# Patient Record
Sex: FEMALE | Race: WHITE | NOT HISPANIC OR LATINO | Employment: UNEMPLOYED | URBAN - METROPOLITAN AREA
[De-identification: names, ages, dates, MRNs, and addresses within clinical notes are randomized per-mention and may not be internally consistent; named-entity substitution may affect disease eponyms.]

---

## 2017-04-22 ENCOUNTER — HOSPITAL ENCOUNTER (EMERGENCY)
Facility: HOSPITAL | Age: 27
Discharge: HOME | End: 2017-04-22
Payer: MEDICAID

## 2017-04-22 VITALS
HEART RATE: 81 BPM | TEMPERATURE: 98 F | RESPIRATION RATE: 16 BRPM | OXYGEN SATURATION: 100 % | HEIGHT: 66 IN | SYSTOLIC BLOOD PRESSURE: 127 MMHG | BODY MASS INDEX: 27.32 KG/M2 | WEIGHT: 170 LBS | DIASTOLIC BLOOD PRESSURE: 73 MMHG

## 2017-04-22 DIAGNOSIS — Z20.2 EXPOSURE TO STD: ICD-10-CM

## 2017-04-22 DIAGNOSIS — J00 NASOPHARYNGITIS ACUTE: ICD-10-CM

## 2017-04-22 DIAGNOSIS — B96.89 BACTERIAL VAGINITIS: ICD-10-CM

## 2017-04-22 DIAGNOSIS — N76.0 BACTERIAL VAGINITIS: ICD-10-CM

## 2017-04-22 DIAGNOSIS — N72 CERVICITIS: Primary | ICD-10-CM

## 2017-04-22 LAB
B-HCG UR QL: NEGATIVE
BACTERIA GENITAL QL WET PREP: ABNORMAL
BILIRUB UR QL STRIP: NEGATIVE
C TRACH DNA SPEC QL NAA+PROBE: NOT DETECTED
CLARITY UR: CLEAR
CLUE CELLS VAG QL WET PREP: ABNORMAL
COLOR UR: YELLOW
DEPRECATED S PYO AG THROAT QL EIA: NEGATIVE
FILAMENT FUNGI VAG WET PREP-#/AREA: ABNORMAL
GLUCOSE UR QL STRIP: NEGATIVE
HGB UR QL STRIP: NEGATIVE
KETONES UR QL STRIP: NEGATIVE
LEUKOCYTE ESTERASE UR QL STRIP: NEGATIVE
N GONORRHOEA DNA SPEC QL NAA+PROBE: NOT DETECTED
NITRITE UR QL STRIP: NEGATIVE
PH UR STRIP: 6 [PH] (ref 5–8)
PROT UR QL STRIP: NEGATIVE
SP GR UR STRIP: 1.01 (ref 1–1.03)
SPECIMEN SOURCE: ABNORMAL
T VAGINALIS GENITAL QL WET PREP: ABNORMAL
URN SPEC COLLECT METH UR: NORMAL
UROBILINOGEN UR STRIP-ACNC: NEGATIVE EU/DL
WBC #/AREA VAG WET PREP: ABNORMAL
YEAST GENITAL QL WET PREP: ABNORMAL

## 2017-04-22 PROCEDURE — 81003 URINALYSIS AUTO W/O SCOPE: CPT

## 2017-04-22 PROCEDURE — 25000003 PHARM REV CODE 250: Performed by: PHYSICIAN ASSISTANT

## 2017-04-22 PROCEDURE — 87081 CULTURE SCREEN ONLY: CPT

## 2017-04-22 PROCEDURE — 87880 STREP A ASSAY W/OPTIC: CPT

## 2017-04-22 PROCEDURE — 96372 THER/PROPH/DIAG INJ SC/IM: CPT

## 2017-04-22 PROCEDURE — 87210 SMEAR WET MOUNT SALINE/INK: CPT

## 2017-04-22 PROCEDURE — 87591 N.GONORRHOEAE DNA AMP PROB: CPT

## 2017-04-22 PROCEDURE — 81025 URINE PREGNANCY TEST: CPT

## 2017-04-22 PROCEDURE — 99284 EMERGENCY DEPT VISIT MOD MDM: CPT | Mod: 25

## 2017-04-22 PROCEDURE — 63600175 PHARM REV CODE 636 W HCPCS: Performed by: PHYSICIAN ASSISTANT

## 2017-04-22 RX ORDER — KETOROLAC TROMETHAMINE 30 MG/ML
60 INJECTION, SOLUTION INTRAMUSCULAR; INTRAVENOUS
Status: COMPLETED | OUTPATIENT
Start: 2017-04-22 | End: 2017-04-22

## 2017-04-22 RX ORDER — ONDANSETRON 4 MG/1
4 TABLET, FILM COATED ORAL EVERY 8 HOURS PRN
Qty: 9 TABLET | Refills: 0 | Status: SHIPPED | OUTPATIENT
Start: 2017-04-22

## 2017-04-22 RX ORDER — METRONIDAZOLE 500 MG/1
500 TABLET ORAL EVERY 12 HOURS
Qty: 14 TABLET | Refills: 0 | Status: SHIPPED | OUTPATIENT
Start: 2017-04-22 | End: 2017-04-29

## 2017-04-22 RX ORDER — CEFTRIAXONE 250 MG/1
250 INJECTION, POWDER, FOR SOLUTION INTRAMUSCULAR; INTRAVENOUS
Status: COMPLETED | OUTPATIENT
Start: 2017-04-22 | End: 2017-04-22

## 2017-04-22 RX ORDER — AZITHROMYCIN 250 MG/1
1000 TABLET, FILM COATED ORAL
Status: COMPLETED | OUTPATIENT
Start: 2017-04-22 | End: 2017-04-22

## 2017-04-22 RX ORDER — ONDANSETRON 4 MG/1
4 TABLET, ORALLY DISINTEGRATING ORAL
Status: COMPLETED | OUTPATIENT
Start: 2017-04-22 | End: 2017-04-22

## 2017-04-22 RX ORDER — IBUPROFEN 600 MG/1
600 TABLET ORAL EVERY 8 HOURS PRN
Qty: 15 TABLET | Refills: 0 | Status: SHIPPED | OUTPATIENT
Start: 2017-04-22

## 2017-04-22 RX ADMIN — KETOROLAC TROMETHAMINE 60 MG: 60 INJECTION, SOLUTION INTRAMUSCULAR at 04:04

## 2017-04-22 RX ADMIN — ONDANSETRON 4 MG: 4 TABLET, ORALLY DISINTEGRATING ORAL at 04:04

## 2017-04-22 RX ADMIN — CEFTRIAXONE SODIUM 250 MG: 250 INJECTION, POWDER, FOR SOLUTION INTRAMUSCULAR; INTRAVENOUS at 04:04

## 2017-04-22 RX ADMIN — AZITHROMYCIN 1000 MG: 250 TABLET, FILM COATED ORAL at 04:04

## 2017-04-22 NOTE — DISCHARGE INSTRUCTIONS
Zofran for nausea  Ibuprofen for headache/pain  Rocephin and Zithromax given in clinic  Fluids  Rest  Salt water gargles  Follow up with PCP  Return to ER with new/worsening complaints    Cervicitis (Chlamydia or Gonorrhea), Treated    You have cervicitis. This is irritation of the opening to the uterus (the cervix). It is usually caused by an infection, which needs to be treated.  Causes  Acute (a new case of) cervicitis is usually caused by an infection.  · The infection is usually a sexually transmitted disease (STD). These are spread by having sex with an infected person.  · Chlamydia and gonorrhea are two common STDs that cause cervicitis. These STDs are very contagious.  · Other bacteria that can cause cervicitis include trichomonas and herpes.  Sometimes the cause of cervicitis cannot be identified.  Symptoms  At first, cervicitis may cause no symptoms or only mild symptoms. When symptoms do occur, they usually appear 2 days to 3 weeks after exposure. When symptoms occur, the most common are:  · Vaginal discharge  · Vaginal bleeding  · Pain  · Rash  · Pain when urinating  · Pain with intercourse  Usually there is no fever. If you have a fever, it may be a sign that the disease has spread to the fallopian tubes and caused pelvic inflammatory disease (PID).  Treatment  Remember that cervicitis is usually the result of a sexually transmitted disease. You can give it or get it without realizing it, since you may have no symptoms.  Whether or not it causes symptoms, it is important to treat cervicitis. This is to prevent it from spreading to the fallopian tubes and becoming PID. If not treated, chlamydia or gonorrhea can scar the fallopian tubes. This can cause infertility (being unable to have children). PID also increases the risk of having a pregnancy outside the uterus (ectopic pregnancy) in the future.  This infection can be treated and cured. The infection is treated with antibiotic medicine.  To determine  the type of infection you have and decide on the appropriate treatment, your healthcare provider may do a test called a culture. A culture is a sample of cells that is grown and observed in a lab. A culture may take a few days to show results.  Home care  · Your sexual partner must be treated at the same time, even if there are no symptoms. Your partner should contact his or her healthcare provider. Or he or she can go to an urgent care clinic or the public health department to be examined and treated.  · Avoid sexual activity until both you and your partner have completed all antibiotic medicine, and you have been told by your healthcare provider that you are no longer contagious.  · Take all medicines until they are finished. If not, the illness may recur.  · Learn about safer sex practices and use these in the future. The safest sex is with a partner who has tested negative and only has sex with you. Condoms offer protection from spreading some sexually transmitted diseases, including gonorrhea, chlamydia, and HIV, but they are not a guarantee.  Follow-up care  Follow up with your healthcare provider, or as advised.  · If a culture was done, you will be notified if the treatment needs to be changed. You can call as directed for the results. Another culture should be done 4 to 6 weeks after treatment, to be sure the infection has cleared.  · If X-rays, a CT scan, or an ultrasound were done, they will be reviewed by a specialist. You will be notified of the results, especially if they affect treatment.  · Follow up with your healthcare provider or the public health department for complete STD screening, including HIV testing.  · For more information about STDs, go to www.cdc.gov/std or call CDC-Info: 341.444.8437.  Call 911  Call emergency services right away if any of these occur:  · Trouble breathing  · Extreme confusion  · Extreme drowsiness or trouble awakening  · Fainting or loss of consciousness  · Rapid  heart rate  When to seek medical advice  Call your healthcare provider right away if any of these occur:  · No improvement after 3 days of treatment  · New or increasing lower abdominal pain or back pain  · Unexpected vaginal bleeding  · Weakness or dizziness  · Repeated vomiting  · Inability to urinate due to pain  · Rash or joint pain  · Painful open sores around the outer vagina  · Enlarged, painful lymph nodes (lumps) in the groin  · Fever of 100.4ºF (38ºC) or higher  Date Last Reviewed: 2015 [employer]. [address] [place] [address]. All rights reserved. This information is not intended as a substitute for professional medical care. Always follow your healthcare professional's instructions.        Bacterial Vaginosis    You have a vaginal infection called bacterial vaginosis (BV). Both good and bad bacteria are present in a healthy vagina. BV occurs when these bacteria get out of balance. The number of bad bacteria increase. And the number of good bacteria decrease.  BV may or may not cause symptoms. If symptoms do occur, they can include:  · Thin, gray, milky-white, or sometimes green discharge  · Unpleasant odor or fishy smell  · Itching, burning, or pain in or around the vagina  It is not known what causes BV, but certain factors can make the problem more likely. This can include:  · Douching  · Having sex with a new partner  · Having sex with more than one partner  BV will sometimes go away on its own. But treatment is usually recommended. This is because untreated BV can increase the risk of more serious health problems such as:  · Pelvic inflammatory disease (PID)  ·  delivery (giving birth to a baby early if youre pregnant)  · HIV and certain other sexually transmitted diseases (STDs)  · Infection after surgery on the reproductive organs  Home care  General care  · BV is most often treated with medicines called antibiotics. These may be given as  pills or as a vaginal cream. If antibiotics are prescribed, be sure to use them exactly as directed. Also, be sure to complete all of the medicine, even if your symptoms go away.  · Avoid douching or having sex during treatment.  · If you have sex with a female partner, ask your healthcare provider if she should also be treated.  Prevention  · Limit or avoid douching.  · Avoid having sex. If you do have sex, then take steps to lower your risk:  ¨ Use condoms when having sex.  ¨ Limit the number of partners you have sex with.  Follow-up care  Follow up with your healthcare provider, or as advised.  When to seek medical advice  Call your healthcare provider right away if:  · You have a fever of 100.4ºF (38ºC) or higher, or as directed by your provider.  · Your symptoms worsen, or they dont go away within a few days of starting treatment.  · You have new pain in the lower belly or pelvic region.  · You have side effects that bother you or a reaction to the pills or cream youre prescribed.  · You or any partners you have sex with have new symptoms, such as a rash, joint pain, or sores.  Date Last Reviewed: 7/30/2015  © 4247-2279 [employer]. [address] [place] [address], [place] [address]. All rights reserved. This information is not intended as a substitute for professional medical care. Always follow your healthcare professional's instructions.              Viral Pharyngitis (Sore Throat)    You (or your child, if your child is the patient) have pharyngitis (sore throat). This infection is caused by a virus. It can cause throat pain that is worse when swallowing, aching all over, headache, and fever. The infection may be spread by coughing, kissing, or touching others after touching your mouth or nose. Antibiotic medications do not work against viruses, so they are not used for treating this condition.  Home care  · If your symptoms are severe, rest at home. Return to work or school when you feel well  enough.   · Drink plenty of fluids to avoid dehydration.  · For children: Use acetaminophen for fever, fussiness or discomfort. In infants over six months of age, you may use ibuprofen instead of acetaminophen. (NOTE: If your child has chronic liver or kidney disease or ever had a stomach ulcer or GI bleeding, talk with your doctor before using these medicines.) (NOTE: Aspirin should never be used in anyone under 18 years of age who is ill with a fever. It may cause severe liver damage.)   · For adults: You may use acetaminophen or ibuprofen to control pain or fever, unless another medicine was prescribed for this. (NOTE: If you have chronic liver or kidney disease or ever had a stomach ulcer or GI bleeding, talk with your doctor before using these medicines.)  · Throat lozenges or numbing throat sprays can help reduce pain. Gargling with warm salt water will also help reduce throat pain. For this, dissolve 1/2 teaspoon of salt in 1 glass of warm water. To help soothe a sore throat, children can sip on juice or a popsicle. Children 5 years and older can also suck on a lollipop or hard candy.  · Avoid salty or spicy foods, which can be irritating to the throat.  Follow-up care  Follow up with your healthcare provider or our staff if you are not improving over the next week.  When to seek medical advice  Call your healthcare provider right away if any of these occur:  · Fever as directed by your doctor.  For children, seek care if:  ¨ Your child is of any age and has repeated fevers above 104°F (40°C).  ¨ Your child is younger than 2 years of age and has a fever of 100.4°F (38°C) that continues for more than 1 day.  ¨ Your child is 2 years old or older and has a fever of 100.4°F (38°C) that continues for more than 3 days.  · New or worsening ear pain, sinus pain, or headache  · Painful lumps in the back of neck  · Stiff neck  · Lymph nodes are getting larger  · Inability to swallow liquids, excessive drooling, or  inability to open mouth wide due to throat pain  · Signs of dehydration (very dark urine or no urine, sunken eyes, dizziness)  · Trouble breathing or noisy breathing  · Muffled voice  · New rash  · Child appears to be getting sicker  Date Last Reviewed: 4/13/2015  © 4197-7323 [employer]. [address] [place] [address], [place] [address]. All rights reserved. This information is not intended as a substitute for professional medical care. Always follow your healthcare professional's instructions.

## 2017-04-22 NOTE — ED AVS SNAPSHOT
[facility]  [id number] [address] [facility] [address]               Samantha Mcginnis Emelyn   2017  3:25 PM   ED    Description:  Female : 1990   Department:  [facility]            Your Care was Coordinated By:     Provider Role From To    Mana Iniguez PA-C Physician Assistant [phone] --      Reason for Visit     Female  Problem     Headache           Diagnoses this Visit        Comments    Cervicitis    -  Primary     Nasopharyngitis acute         Exposure to STD         Bacterial vaginitis           ED Disposition     ED Disposition Condition Comment    Discharge             To Do List           Follow-up Information     Follow up with [facility] In 3 days.    Contact information:    [address] [place] [id number] [phone]         These Medications        Disp Refills Start End    ondansetron (ZOFRAN) 4 MG tablet 9 tablet 0 2017     Take 1 tablet (4 mg total) by mouth every 8 (eight) hours as needed for Nausea. - Oral    ibuprofen (ADVIL,MOTRIN) 600 MG tablet 15 tablet 0 2017     Take 1 tablet (600 mg total) by mouth every 8 (eight) hours as needed for Pain. - Oral    metronidazole (FLAGYL) 500 MG tablet 14 tablet 0 2017    Take 1 tablet (500 mg total) by mouth every 12 (twelve) hours. - Oral      [facility]     [facility] Care [facility] - 24/7 Assistance  Unless otherwise directed by your provider, please contact [facility], our nurse care line that is available for 24/7 assistance.     Registered nurses in the [facility] provide: appointment scheduling, clinical advisement, health education, and other advisory services.  Call: [phone] (toll free)               Medications           Message regarding Medications     Verify the changes and/or additions to your medication regime listed below are the same as discussed with your clinician  today.  If any of these changes or additions are incorrect, please notify your healthcare provider.        START taking these NEW medications        Refills    ondansetron (ZOFRAN) 4 MG tablet 0    Sig: Take 1 tablet (4 mg total) by mouth every 8 (eight) hours as needed for Nausea.    Class: Print    Route: Oral    ibuprofen (ADVIL,MOTRIN) 600 MG tablet 0    Sig: Take 1 tablet (600 mg total) by mouth every 8 (eight) hours as needed for Pain.    Class: Print    Route: Oral    metronidazole (FLAGYL) 500 MG tablet 0    Sig: Take 1 tablet (500 mg total) by mouth every 12 (twelve) hours.    Class: Print    Route: Oral      These medications were administered today        Dose Freq    ondansetron disintegrating tablet 4 mg 4 mg ED 1 Time    Sig: Take 1 tablet (4 mg total) by mouth ED 1 Time.    Class: Normal    Route: Oral    Cosign for Ordering: Accepted by Renuka Malcolm DO on 4/22/2017  4:27 PM    ketorolac injection 60 mg 60 mg ED 1 Time    Sig: Inject 2 mLs (60 mg total) into the muscle ED 1 Time.    Class: Normal    Route: Intramuscular    Non-formulary Exception Code: Specific indication for non-formulary alternative    Cosign for Ordering: Accepted by Renuka Malcolm DO on 4/22/2017  4:27 PM    cefTRIAXone injection 250 mg 250 mg ED 1 Time    Sig: Inject 250 mg into the muscle ED 1 Time.    Class: Normal    Route: Intramuscular    Cosign for Ordering: Accepted by Renuka Malcolm DO on 4/22/2017  4:27 PM    azithromycin tablet 1,000 mg 1,000 mg ED 1 Time    Sig: Take 4 tablets (1,000 mg total) by mouth ED 1 Time.    Class: Normal    Route: Oral    Cosign for Ordering: Accepted by Renuka Malcolm DO on 4/22/2017  4:27 PM           Verify that the below list of medications is an accurate representation of the medications you are currently taking.  If none reported, the list may be blank. If incorrect, please contact your healthcare provider. Carry this list with you in case of emergency.          "  Current Medications     ibuprofen (ADVIL,MOTRIN) 600 MG tablet Take 1 tablet (600 mg total) by mouth every 8 (eight) hours as needed for Pain.    metronidazole (FLAGYL) 500 MG tablet Take 1 tablet (500 mg total) by mouth every 12 (twelve) hours.    ondansetron (ZOFRAN) 4 MG tablet Take 1 tablet (4 mg total) by mouth every 8 (eight) hours as needed for Nausea.           Clinical Reference Information           Your Vitals Were     BP Pulse Temp Resp Height Weight    169/84 (BP Location: Left arm, Patient Position: Sitting) 104 98.1 °F (36.7 °C) (Oral) 18 5' 6" (1.676 m) 77.1 kg (170 lb)    Last Period SpO2 BMI          04/08/2017 (Exact Date) 98% 27.44 kg/m2        Allergies as of 4/22/2017     No Known Allergies      Immunizations Administered on Date of Encounter - 4/22/2017     None      ED Micro, Lab, POCT     Start Ordered       Status Ordering Provider    04/22/17 1538 04/22/17 1537  Vaginal Screen Vagina  STAT      Final result     04/22/17 1537 04/22/17 1537    STAT,   Status:  Canceled      Canceled     04/22/17 1537 04/22/17 1537  Rapid strep screen  STAT      Final result     04/22/17 1537 04/22/17 1537  C. trachomatis/N. gonorrhoeae by AMP DNA Cervix  STAT      In process     04/22/17 1537 04/22/17 1537  Strep A culture, throat  Once      In process     04/22/17 1525 04/22/17 1524  Urinalysis  STAT      Final result     04/22/17 1525 04/22/17 1524  Pregnancy, urine rapid (UPT)  Once      In process       ED Imaging Orders     None        Discharge Instructions         Zofran for nausea  Ibuprofen for headache/pain  Rocephin and Zithromax given in clinic  Fluids  Rest  Salt water gargles  Follow up with PCP  Return to ER with new/worsening complaints    Cervicitis (Chlamydia or Gonorrhea), Treated    You have cervicitis. This is irritation of the opening to the uterus (the cervix). It is usually caused by an infection, which needs to be treated.  Causes  Acute (a new case of) cervicitis is usually caused " by an infection.  · The infection is usually a sexually transmitted disease (STD). These are spread by having sex with an infected person.  · Chlamydia and gonorrhea are two common STDs that cause cervicitis. These STDs are very contagious.  · Other bacteria that can cause cervicitis include trichomonas and herpes.  Sometimes the cause of cervicitis cannot be identified.  Symptoms  At first, cervicitis may cause no symptoms or only mild symptoms. When symptoms do occur, they usually appear 2 days to 3 weeks after exposure. When symptoms occur, the most common are:  · Vaginal discharge  · Vaginal bleeding  · Pain  · Rash  · Pain when urinating  · Pain with intercourse  Usually there is no fever. If you have a fever, it may be a sign that the disease has spread to the fallopian tubes and caused pelvic inflammatory disease (PID).  Treatment  Remember that cervicitis is usually the result of a sexually transmitted disease. You can give it or get it without realizing it, since you may have no symptoms.  Whether or not it causes symptoms, it is important to treat cervicitis. This is to prevent it from spreading to the fallopian tubes and becoming PID. If not treated, chlamydia or gonorrhea can scar the fallopian tubes. This can cause infertility (being unable to have children). PID also increases the risk of having a pregnancy outside the uterus (ectopic pregnancy) in the future.  This infection can be treated and cured. The infection is treated with antibiotic medicine.  To determine the type of infection you have and decide on the appropriate treatment, your healthcare provider may do a test called a culture. A culture is a sample of cells that is grown and observed in a lab. A culture may take a few days to show results.  Home care  · Your sexual partner must be treated at the same time, even if there are no symptoms. Your partner should contact his or her healthcare provider. Or he or she can go to an urgent care  clinic or the public health department to be examined and treated.  · Avoid sexual activity until both you and your partner have completed all antibiotic medicine, and you have been told by your healthcare provider that you are no longer contagious.  · Take all medicines until they are finished. If not, the illness may recur.  · Learn about safer sex practices and use these in the future. The safest sex is with a partner who has tested negative and only has sex with you. Condoms offer protection from spreading some sexually transmitted diseases, including gonorrhea, chlamydia, and HIV, but they are not a guarantee.  Follow-up care  Follow up with your healthcare provider, or as advised.  · If a culture was done, you will be notified if the treatment needs to be changed. You can call as directed for the results. Another culture should be done 4 to 6 weeks after treatment, to be sure the infection has cleared.  · If X-rays, a CT scan, or an ultrasound were done, they will be reviewed by a specialist. You will be notified of the results, especially if they affect treatment.  · Follow up with your healthcare provider or the public health department for complete STD screening, including HIV testing.  · For more information about STDs, go to www.cdc.gov/std or call CDC-Info: 628.355.4311.  Call 911  Call emergency services right away if any of these occur:  · Trouble breathing  · Extreme confusion  · Extreme drowsiness or trouble awakening  · Fainting or loss of consciousness  · Rapid heart rate  When to seek medical advice  Call your healthcare provider right away if any of these occur:  · No improvement after 3 days of treatment  · New or increasing lower abdominal pain or back pain  · Unexpected vaginal bleeding  · Weakness or dizziness  · Repeated vomiting  · Inability to urinate due to pain  · Rash or joint pain  · Painful open sores around the outer vagina  · Enlarged, painful lymph nodes (lumps) in the  groin  · Fever of 100.4ºF (38ºC) or higher  Date Last Reviewed: 2015  © 2205-5753 [employer]. [address] [place] [address], [place] [address]. All rights reserved. This information is not intended as a substitute for professional medical care. Always follow your healthcare professional's instructions.        Bacterial Vaginosis    You have a vaginal infection called bacterial vaginosis (BV). Both good and bad bacteria are present in a healthy vagina. BV occurs when these bacteria get out of balance. The number of bad bacteria increase. And the number of good bacteria decrease.  BV may or may not cause symptoms. If symptoms do occur, they can include:  · Thin, gray, milky-white, or sometimes green discharge  · Unpleasant odor or fishy smell  · Itching, burning, or pain in or around the vagina  It is not known what causes BV, but certain factors can make the problem more likely. This can include:  · Douching  · Having sex with a new partner  · Having sex with more than one partner  BV will sometimes go away on its own. But treatment is usually recommended. This is because untreated BV can increase the risk of more serious health problems such as:  · Pelvic inflammatory disease (PID)  ·  delivery (giving birth to a baby early if youre pregnant)  · HIV and certain other sexually transmitted diseases (STDs)  · Infection after surgery on the reproductive organs  Home care  General care  · BV is most often treated with medicines called antibiotics. These may be given as pills or as a vaginal cream. If antibiotics are prescribed, be sure to use them exactly as directed. Also, be sure to complete all of the medicine, even if your symptoms go away.  · Avoid douching or having sex during treatment.  · If you have sex with a female partner, ask your healthcare provider if she should also be treated.  Prevention  · Limit or avoid douching.  · Avoid having sex. If you do have sex, then take steps to lower  your risk:  ¨ Use condoms when having sex.  ¨ Limit the number of partners you have sex with.  Follow-up care  Follow up with your healthcare provider, or as advised.  When to seek medical advice  Call your healthcare provider right away if:  · You have a fever of 100.4ºF (38ºC) or higher, or as directed by your provider.  · Your symptoms worsen, or they dont go away within a few days of starting treatment.  · You have new pain in the lower belly or pelvic region.  · You have side effects that bother you or a reaction to the pills or cream youre prescribed.  · You or any partners you have sex with have new symptoms, such as a rash, joint pain, or sores.  Date Last Reviewed: 7/30/2015 © 2000-2016 [employer]. [address] [place] [address]. All rights reserved. This information is not intended as a substitute for professional medical care. Always follow your healthcare professional's instructions.              Viral Pharyngitis (Sore Throat)    You (or your child, if your child is the patient) have pharyngitis (sore throat). This infection is caused by a virus. It can cause throat pain that is worse when swallowing, aching all over, headache, and fever. The infection may be spread by coughing, kissing, or touching others after touching your mouth or nose. Antibiotic medications do not work against viruses, so they are not used for treating this condition.  Home care  · If your symptoms are severe, rest at home. Return to work or school when you feel well enough.   · Drink plenty of fluids to avoid dehydration.  · For children: Use acetaminophen for fever, fussiness or discomfort. In infants over six months of age, you may use ibuprofen instead of acetaminophen. (NOTE: If your child has chronic liver or kidney disease or ever had a stomach ulcer or GI bleeding, talk with your doctor before using these medicines.) (NOTE: Aspirin should never be used in anyone under 18 years of age who is ill  with a fever. It may cause severe liver damage.)   · For adults: You may use acetaminophen or ibuprofen to control pain or fever, unless another medicine was prescribed for this. (NOTE: If you have chronic liver or kidney disease or ever had a stomach ulcer or GI bleeding, talk with your doctor before using these medicines.)  · Throat lozenges or numbing throat sprays can help reduce pain. Gargling with warm salt water will also help reduce throat pain. For this, dissolve 1/2 teaspoon of salt in 1 glass of warm water. To help soothe a sore throat, children can sip on juice or a popsicle. Children 5 years and older can also suck on a lollipop or hard candy.  · Avoid salty or spicy foods, which can be irritating to the throat.  Follow-up care  Follow up with your healthcare provider or our staff if you are not improving over the next week.  When to seek medical advice  Call your healthcare provider right away if any of these occur:  · Fever as directed by your doctor.  For children, seek care if:  ¨ Your child is of any age and has repeated fevers above 104°F (40°C).  ¨ Your child is younger than 2 years of age and has a fever of 100.4°F (38°C) that continues for more than 1 day.  ¨ Your child is 2 years old or older and has a fever of 100.4°F (38°C) that continues for more than 3 days.  · New or worsening ear pain, sinus pain, or headache  · Painful lumps in the back of neck  · Stiff neck  · Lymph nodes are getting larger  · Inability to swallow liquids, excessive drooling, or inability to open mouth wide due to throat pain  · Signs of dehydration (very dark urine or no urine, sunken eyes, dizziness)  · Trouble breathing or noisy breathing  · Muffled voice  · New rash  · Child appears to be getting sicker  Date Last Reviewed: 4/13/2015  © 8192-9106 [employer]. [address] [place] [address], [place] [address]. All rights reserved. This information is not intended as a substitute for professional medical care.  Always follow your healthcare professional's instructions.            [facility] Sign-Up     Activating your [facility] account is as easy as 1-2-3!     1) Visit [email or web address], select Sign Up Now, enter this activation code and your date of birth, then select Next.  [email or web address]  Expires: 6/6/2017  4:38 PM      2) Create a username and password to use when you visit [facility] in the future and select a security question in case you lose your password and select Next.    3) Enter your e-mail address and click Sign Up!    Additional Information  If you have questions, please e-mail "[email or web address] or call [phone] to talk to our [employer] staff. Remember, [facility] is NOT to be used for urgent needs. For medical emergencies, dial 911.          [facility] complies with applicable Federal civil rights laws and does not discriminate on the basis of race, color, national origin, age, disability, or sex.        Language Assistance Services     ATTENTION: Language assistance services are available, free of charge. Please call [phone].      ATENCIÓN: Si habla marcoañol, tiene a arnold disposición servicios gratuitos de asistencia lingüística. Llame al [phone].     CHÚ Ý: N?u b?n nói Ti?ng Vi?t, có các d?ch v? h? tr? ngôn ng? mi?n phí dành cho b?n. G?i s? [phone].

## 2017-04-22 NOTE — ED PROVIDER NOTES
"SCRIBE #1 NOTE: I, Reji Leone, am scribing for, and in the presence of, MONICA Buitrago. I have scribed the entire note.      History      Chief Complaint   Patient presents with    Female  Problem     pain and white vaginal discharge, pt just found out she was exposed to gonorrhea approx 1 month ago    Headache     + fever and N/V       Review of patient's allergies indicates:  No Known Allergies     HPI   HPI    4/22/2017, 3:27 PM   History obtained from the patient      History of Present Illness: Samantha Dunaway is a 27 y.o. female patient who presents to the Emergency Department for sore throat and fever which onset gradually 1 week ago. Sx are intermittent and moderate in severity. Associated with "scratchy" throat and mild post nasal drip. Denies cough. Reports a fever of "I think 101, a few days ago". There are no mitigating or exacerbating factors noted. Pt reports getting generalized HA the past 3-4 days as well as having nausea and a few episodes of emesis. She also reports having some pelvic cramping similar to menstrual cramps and white vaginal discharge the past 3 days and was concerned because she just found out she may have been exposed to gonorrhea over a month ago. The past 3 days she also has noticed 2 areas of rash to left side of neck that she described as irritated skin. Pt denies any CP, SOB, cough, congestion, diarrhea, vaginal bleeding, dysuria, and all other sx at this time. No further complaints or concerns at this time.       Arrival mode: Personal vehicle      PCP: Primary Doctor No       Past Medical History:  Past medical history reviewed not relevant      Past Surgical History:  Past surgical history reviewed not relevant      Family History:  Family history reviewed not relevant      Social History:  Social History    Social History Main Topics    Social History Main Topics    Smoking status: Unknown if ever smoked    Smokeless tobacco: Unknown if ever used    Alcohol " Use: Unknown drinking history    Drug Use: Unknown if ever used    Sexual Activity: Unknown           ROS   Review of Systems   Constitutional: Positive for fever. Negative for chills.   HENT: Positive for sore throat. Negative for congestion, ear discharge, ear pain and trouble swallowing.    Respiratory: Negative for cough and shortness of breath.    Cardiovascular: Negative for chest pain.   Gastrointestinal: Positive for nausea and vomiting. Negative for abdominal pain and diarrhea.   Genitourinary: Positive for pelvic pain (cramps) and vaginal discharge. Negative for difficulty urinating, dysuria, hematuria and vaginal bleeding.   Musculoskeletal: Negative for back pain.   Skin: Positive for rash. Negative for wound.   Neurological: Positive for headaches. Negative for weakness and numbness.   Hematological: Does not bruise/bleed easily.   All other systems reviewed and are negative.      Physical Exam    Initial Vitals   BP Pulse Resp Temp SpO2   04/22/17 1523 04/22/17 1523 04/22/17 1523 04/22/17 1523 04/22/17 1523   169/84 104 18 98.1 °F (36.7 °C) 98 %      Physical Exam  Nursing Notes and Vital Signs Reviewed.  Constitutional: Patient is in no acute distress. Awake and alert. Well-developed and well-nourished.  Head: Atraumatic. Normocephalic.  Eyes: PERRL. EOM intact. Conjunctivae are not pale. No scleral icterus.  ENT: Mucous membranes are moist. Oropharynx is clear and symmetric.   Tonsils swollen (2+) without exudate.  No petechiae.  Nares mildly edematous. No rhinorrhea  Neck: Supple. Full ROM. No cervical lymphadenopathy.  Cardiovascular: Regular rate. Regular rhythm. No murmurs, rubs, or gallops.  Pulmonary/Chest: No respiratory distress. Clear to auscultation bilaterally. No wheezing, rales, or rhonchi.  Abdominal: Soft and non-distended.  There is no tenderness.  No rebound, guarding, or rigidity.  Good bowel sounds.    Pelvic: A female chaperone was present for this examination.  Nl external  "inspection. No lesions or abnormalities were visible on the labia majora or minora. Cervical os is closed. There is + mild CMT. There is no blood in the vaginal vault. Yellowish cervical and vaginal discharge. No adnexal tenderness. No adnexal masses.  Musculoskeletal: Moves all extremities. No obvious deformities. No edema  Skin: Warm and dry. 2 papular lesions to left side of neck, non-tender, no surrounding erythema.  Neurological:  Alert, awake, and appropriate.  Normal speech.  No acute focal neurological deficits are appreciated.  Psychiatric: Normal affect. Good eye contact. Appropriate in content.    ED Course    Procedures  ED Vital Signs:  Vitals:    04/22/17 1523   BP: (!) 169/84   Pulse: 104   Resp: 18   Temp: 98.1 °F (36.7 °C)   TempSrc: Oral   SpO2: 98%   Weight: 77.1 kg (170 lb)   Height: 5' 6" (1.676 m)       Abnormal Lab Results:  Labs Reviewed   VAGINAL SCREEN - Abnormal; Notable for the following:        Result Value    Clue Cells, Wet Prep Occasional (*)     WBC - Vaginal Screen Occasional (*)     Bacteria - Vaginal Screen Few (*)     All other components within normal limits   THROAT SCREEN, RAPID   C. TRACHOMATIS/N. GONORRHOEAE BY AMP DNA   CULTURE, STREP A,  THROAT   URINALYSIS   PREGNANCY TEST, URINE RAPID        All Lab Results:  Results for orders placed or performed during the hospital encounter of 04/22/17   Rapid strep screen   Result Value Ref Range    Rapid Strep A Screen Negative Negative   Urinalysis   Result Value Ref Range    Specimen UA Urine, Clean Catch     Color, UA Yellow Yellow, Straw, Leticia    Appearance, UA Clear Clear    pH, UA 6.0 5.0 - 8.0    Specific Gravity, UA 1.015 1.005 - 1.030    Protein, UA Negative Negative    Glucose, UA Negative Negative    Ketones, UA Negative Negative    Bilirubin (UA) Negative Negative    Occult Blood UA Negative Negative    Nitrite, UA Negative Negative    Urobilinogen, UA Negative <2.0 EU/dL    Leukocytes, UA Negative Negative   Pregnancy, " urine rapid (UPT)   Result Value Ref Range    Preg Test, Ur Negative    Vaginal Screen Vagina   Result Value Ref Range    Trichomonas None None    Clue Cells, Wet Prep Occasional (A) None    Budding Yeast None None    Fungal Hyphae None None    WBC - Vaginal Screen Occasional (A) None    Bacteria - Vaginal Screen Few (A) None    Wet Prep Source Vagina None        The Emergency Provider reviewed the vital signs and test results, which are outlined above.    ED Discussion     4:35 PM: Reassessed pt at this time. Awake and alert. NAD.  Pt states her condition has improved after tx at this time. Discussed with pt all pertinent ED information and results. Discussed pt dx and plan of tx. Gave pt all f/u and return to the ED instructions. All questions and concerns were addressed at this time. Pt expresses understanding of information and instructions, and is comfortable with plan to discharge. Pt is stable for discharge.      I discussed with patient and/or family/caretaker that evaluation in the ED does not suggest any emergent or life threatening medical conditions requiring immediate intervention beyond what was provided in the ED, and I believe patient is safe for discharge.  Regardless, an unremarkable evaluation in the ED does not preclude the development or presence of a serious of life threatening condition. As such, patient was instructed to return immediately for any worsening or change in current symptoms.      Pre-hypertension/Hypertension: The pt has been informed that they may have pre-hypertension or hypertension based on a blood pressure reading in the ED. I recommend that the pt call the PCP listed on their discharge instructions or a physician of their choice this week to arrange f/u for further evaluation of possible pre-hypertension or hypertension.     ED Medication(s):  Medications   ondansetron disintegrating tablet 4 mg (4 mg Oral Given 4/22/17 5075)   ketorolac injection 60 mg (60 mg Intramuscular  Given 4/22/17 1633)   cefTRIAXone injection 250 mg (250 mg Intramuscular Given 4/22/17 1633)   azithromycin tablet 1,000 mg (1,000 mg Oral Given 4/22/17 1633)       New Prescriptions    IBUPROFEN (ADVIL,MOTRIN) 600 MG TABLET    Take 1 tablet (600 mg total) by mouth every 8 (eight) hours as needed for Pain.    METRONIDAZOLE (FLAGYL) 500 MG TABLET    Take 1 tablet (500 mg total) by mouth every 12 (twelve) hours.    ONDANSETRON (ZOFRAN) 4 MG TABLET    Take 1 tablet (4 mg total) by mouth every 8 (eight) hours as needed for Nausea.       Follow-up Information     Follow up with [facility] In 3 days.    Contact information:    [address] [facility] [address] [phone]               Medical Decision Making    Medical Decision Making:   Clinical Tests:   Lab Tests: Ordered and Reviewed           Scribe Attestation:   Scribe #1: I performed the above scribed service and the documentation accurately describes the services I performed. I attest to the accuracy of the note.    APC:   APC Statement for Scribe #1: I, MONICA Buitrago, personally performed the services described in this documentation, as scribed by Reji Leone in my presence, and it is both accurate and complete.          Clinical Impression       ICD-10-CM ICD-9-CM   1. Cervicitis N72 616.0   2. Nasopharyngitis acute J00 460   3. Exposure to STD Z20.2 V01.6   4. Bacterial vaginitis N76.0 616.10    B96.89 041.9       Disposition:   Disposition: Discharged  Condition: Stable         MONICA Choi-MARIUSZ  04/22/17 8524

## 2017-04-24 LAB — BACTERIA THROAT CULT: NORMAL

## 2017-07-27 ENCOUNTER — HOSPITAL ENCOUNTER (INPATIENT)
Facility: HOSPITAL | Age: 27
LOS: 1 days | Discharge: HOME | DRG: 872 | End: 2017-07-29
Attending: EMERGENCY MEDICINE | Admitting: INTERNAL MEDICINE
Payer: MEDICAID

## 2017-07-27 DIAGNOSIS — R50.9 FEVER: ICD-10-CM

## 2017-07-27 DIAGNOSIS — A41.9 SEPSIS: ICD-10-CM

## 2017-07-27 DIAGNOSIS — A41.9 SEPSIS, DUE TO UNSPECIFIED ORGANISM: ICD-10-CM

## 2017-07-27 DIAGNOSIS — R21 RASH OF ENTIRE BODY: Primary | ICD-10-CM

## 2017-07-27 LAB — B-HCG UR QL: NEGATIVE

## 2017-07-27 PROCEDURE — 25000003 PHARM REV CODE 250: Performed by: EMERGENCY MEDICINE

## 2017-07-27 PROCEDURE — 81025 URINE PREGNANCY TEST: CPT

## 2017-07-27 PROCEDURE — 80053 COMPREHEN METABOLIC PANEL: CPT

## 2017-07-27 PROCEDURE — 11000001 HC ACUTE MED/SURG PRIVATE ROOM

## 2017-07-27 PROCEDURE — 83605 ASSAY OF LACTIC ACID: CPT

## 2017-07-27 PROCEDURE — 87798 DETECT AGENT NOS DNA AMP: CPT

## 2017-07-27 PROCEDURE — 87040 BLOOD CULTURE FOR BACTERIA: CPT | Mod: 59

## 2017-07-27 PROCEDURE — 99285 EMERGENCY DEPT VISIT HI MDM: CPT | Mod: 25

## 2017-07-27 PROCEDURE — 81000 URINALYSIS NONAUTO W/SCOPE: CPT

## 2017-07-27 RX ORDER — ACETAMINOPHEN 325 MG/1
650 TABLET ORAL
Status: COMPLETED | OUTPATIENT
Start: 2017-07-27 | End: 2017-07-27

## 2017-07-27 RX ADMIN — ACETAMINOPHEN 650 MG: 325 TABLET ORAL at 11:07

## 2017-07-28 PROBLEM — Z72.0 TOBACCO USE: Status: ACTIVE | Noted: 2017-07-28

## 2017-07-28 PROBLEM — L29.9 PRURITUS: Status: ACTIVE | Noted: 2017-07-28

## 2017-07-28 PROBLEM — A41.9 SEPSIS: Status: ACTIVE | Noted: 2017-07-28

## 2017-07-28 PROBLEM — R21 RASH OF ENTIRE BODY: Status: ACTIVE | Noted: 2017-07-28

## 2017-07-28 LAB
ALBUMIN SERPL BCP-MCNC: 2.9 G/DL
ALP SERPL-CCNC: 75 U/L
ALT SERPL W/O P-5'-P-CCNC: 82 U/L
AMPHET+METHAMPHET UR QL: NORMAL
ANION GAP SERPL CALC-SCNC: 11 MMOL/L
ANISOCYTOSIS BLD QL SMEAR: SLIGHT
AST SERPL-CCNC: 73 U/L
BACTERIA #/AREA URNS HPF: ABNORMAL /HPF
BARBITURATES UR QL SCN>200 NG/ML: NEGATIVE
BASOPHILS # BLD AUTO: 0.03 K/UL
BASOPHILS NFR BLD: 0.2 %
BENZODIAZ UR QL SCN>200 NG/ML: NEGATIVE
BILIRUB SERPL-MCNC: 1 MG/DL
BILIRUB UR QL STRIP: ABNORMAL
BUN SERPL-MCNC: 8 MG/DL
BZE UR QL SCN: NEGATIVE
CALCIUM SERPL-MCNC: 9.2 MG/DL
CANNABINOIDS UR QL SCN: NORMAL
CHLORIDE SERPL-SCNC: 97 MMOL/L
CLARITY UR: CLEAR
CO2 SERPL-SCNC: 29 MMOL/L
COLOR UR: YELLOW
CREAT SERPL-MCNC: 0.7 MG/DL
CREAT UR-MCNC: 232.9 MG/DL
DACRYOCYTES BLD QL SMEAR: ABNORMAL
DIFFERENTIAL METHOD: ABNORMAL
EOSINOPHIL # BLD AUTO: 0.1 K/UL
EOSINOPHIL NFR BLD: 0.8 %
ERYTHROCYTE [DISTWIDTH] IN BLOOD BY AUTOMATED COUNT: 18.7 %
EST. GFR  (AFRICAN AMERICAN): >60 ML/MIN/1.73 M^2
EST. GFR  (NON AFRICAN AMERICAN): >60 ML/MIN/1.73 M^2
GLUCOSE SERPL-MCNC: 95 MG/DL
GLUCOSE UR QL STRIP: NEGATIVE
HCT VFR BLD AUTO: 27.6 %
HGB BLD-MCNC: 7.9 G/DL
HGB UR QL STRIP: ABNORMAL
HIV1+2 IGG SERPL QL IA.RAPID: NEGATIVE
HYPOCHROMIA BLD QL SMEAR: ABNORMAL
KETONES UR QL STRIP: NEGATIVE
LACTATE SERPL-SCNC: 1.8 MMOL/L
LEUKOCYTE ESTERASE UR QL STRIP: ABNORMAL
LYMPHOCYTES # BLD AUTO: 1.8 K/UL
LYMPHOCYTES NFR BLD: 13.3 %
MCH RBC QN AUTO: 18.6 PG
MCHC RBC AUTO-ENTMCNC: 28.6 G/DL
MCV RBC AUTO: 65 FL
METHADONE UR QL SCN>300 NG/ML: NEGATIVE
MICROSCOPIC COMMENT: ABNORMAL
MONOCYTES # BLD AUTO: 1.1 K/UL
MONOCYTES NFR BLD: 7.8 %
NEUTROPHILS # BLD AUTO: 10.7 K/UL
NEUTROPHILS NFR BLD: 77.9 %
NITRITE UR QL STRIP: NEGATIVE
OPIATES UR QL SCN: NORMAL
OVALOCYTES BLD QL SMEAR: ABNORMAL
PCP UR QL SCN>25 NG/ML: NEGATIVE
PH UR STRIP: 6 [PH] (ref 5–8)
PLATELET # BLD AUTO: 263 K/UL
PLATELET BLD QL SMEAR: ABNORMAL
PMV BLD AUTO: 10.3 FL
POIKILOCYTOSIS BLD QL SMEAR: SLIGHT
POLYCHROMASIA BLD QL SMEAR: ABNORMAL
POTASSIUM SERPL-SCNC: 2.8 MMOL/L
PROT SERPL-MCNC: 7 G/DL
PROT UR QL STRIP: ABNORMAL
RBC # BLD AUTO: 4.24 M/UL
RBC #/AREA URNS HPF: 1 /HPF (ref 0–4)
SODIUM SERPL-SCNC: 137 MMOL/L
SP GR UR STRIP: 1.02 (ref 1–1.03)
SQUAMOUS #/AREA URNS HPF: 3 /HPF
TOXICOLOGY INFORMATION: NORMAL
URN SPEC COLLECT METH UR: ABNORMAL
UROBILINOGEN UR STRIP-ACNC: ABNORMAL EU/DL
WBC # BLD AUTO: 13.76 K/UL
WBC #/AREA URNS HPF: 6 /HPF (ref 0–5)

## 2017-07-28 PROCEDURE — 96375 TX/PRO/DX INJ NEW DRUG ADDON: CPT

## 2017-07-28 PROCEDURE — C1751 CATH, INF, PER/CENT/MIDLINE: HCPCS

## 2017-07-28 PROCEDURE — 85025 COMPLETE CBC W/AUTO DIFF WBC: CPT

## 2017-07-28 PROCEDURE — 25000003 PHARM REV CODE 250: Performed by: INTERNAL MEDICINE

## 2017-07-28 PROCEDURE — 96366 THER/PROPH/DIAG IV INF ADDON: CPT

## 2017-07-28 PROCEDURE — S4991 NICOTINE PATCH NONLEGEND: HCPCS | Performed by: INTERNAL MEDICINE

## 2017-07-28 PROCEDURE — 80307 DRUG TEST PRSMV CHEM ANLYZR: CPT

## 2017-07-28 PROCEDURE — 63600175 PHARM REV CODE 636 W HCPCS: Performed by: INTERNAL MEDICINE

## 2017-07-28 PROCEDURE — 96365 THER/PROPH/DIAG IV INF INIT: CPT

## 2017-07-28 PROCEDURE — 96376 TX/PRO/DX INJ SAME DRUG ADON: CPT

## 2017-07-28 PROCEDURE — 25000003 PHARM REV CODE 250: Performed by: EMERGENCY MEDICINE

## 2017-07-28 PROCEDURE — 02HV33Z INSERTION OF INFUSION DEVICE INTO SUPERIOR VENA CAVA, PERCUTANEOUS APPROACH: ICD-10-PCS | Performed by: INTERNAL MEDICINE

## 2017-07-28 PROCEDURE — 11000001 HC ACUTE MED/SURG PRIVATE ROOM

## 2017-07-28 PROCEDURE — 36569 INSJ PICC 5 YR+ W/O IMAGING: CPT

## 2017-07-28 PROCEDURE — 96368 THER/DIAG CONCURRENT INF: CPT

## 2017-07-28 PROCEDURE — 63600175 PHARM REV CODE 636 W HCPCS: Performed by: EMERGENCY MEDICINE

## 2017-07-28 PROCEDURE — 96367 TX/PROPH/DG ADDL SEQ IV INF: CPT

## 2017-07-28 PROCEDURE — 86703 HIV-1/HIV-2 1 RESULT ANTBDY: CPT

## 2017-07-28 RX ORDER — POTASSIUM CHLORIDE 20 MEQ/1
60 TABLET, EXTENDED RELEASE ORAL DAILY
Status: DISCONTINUED | OUTPATIENT
Start: 2017-07-28 | End: 2017-07-28

## 2017-07-28 RX ORDER — IPRATROPIUM BROMIDE AND ALBUTEROL SULFATE 2.5; .5 MG/3ML; MG/3ML
3 SOLUTION RESPIRATORY (INHALATION) EVERY 4 HOURS PRN
Status: DISCONTINUED | OUTPATIENT
Start: 2017-07-28 | End: 2017-07-29 | Stop reason: HOSPADM

## 2017-07-28 RX ORDER — POTASSIUM CHLORIDE 20 MEQ/15ML
60 SOLUTION ORAL
Status: COMPLETED | OUTPATIENT
Start: 2017-07-28 | End: 2017-07-28

## 2017-07-28 RX ORDER — ONDANSETRON 4 MG/1
4 TABLET, ORALLY DISINTEGRATING ORAL
Status: COMPLETED | OUTPATIENT
Start: 2017-07-28 | End: 2017-07-28

## 2017-07-28 RX ORDER — ONDANSETRON 2 MG/ML
4 INJECTION INTRAMUSCULAR; INTRAVENOUS EVERY 8 HOURS PRN
Status: DISCONTINUED | OUTPATIENT
Start: 2017-07-28 | End: 2017-07-28

## 2017-07-28 RX ORDER — IBUPROFEN 200 MG
1 TABLET ORAL DAILY
Status: DISCONTINUED | OUTPATIENT
Start: 2017-07-28 | End: 2017-07-29 | Stop reason: HOSPADM

## 2017-07-28 RX ORDER — CEFEPIME HYDROCHLORIDE 1 G/50ML
1 INJECTION, SOLUTION INTRAVENOUS
Status: DISCONTINUED | OUTPATIENT
Start: 2017-07-28 | End: 2017-07-28

## 2017-07-28 RX ORDER — OXYCODONE AND ACETAMINOPHEN 7.5; 325 MG/1; MG/1
1 TABLET ORAL EVERY 4 HOURS PRN
Status: DISCONTINUED | OUTPATIENT
Start: 2017-07-28 | End: 2017-07-29 | Stop reason: HOSPADM

## 2017-07-28 RX ORDER — ONDANSETRON 2 MG/ML
4 INJECTION INTRAMUSCULAR; INTRAVENOUS EVERY 8 HOURS PRN
Status: DISCONTINUED | OUTPATIENT
Start: 2017-07-28 | End: 2017-07-29 | Stop reason: HOSPADM

## 2017-07-28 RX ORDER — MORPHINE SULFATE 2 MG/ML
2 INJECTION, SOLUTION INTRAMUSCULAR; INTRAVENOUS EVERY 4 HOURS PRN
Status: DISCONTINUED | OUTPATIENT
Start: 2017-07-28 | End: 2017-07-29

## 2017-07-28 RX ORDER — HYDRALAZINE HYDROCHLORIDE 20 MG/ML
10 INJECTION INTRAMUSCULAR; INTRAVENOUS EVERY 6 HOURS PRN
Status: DISCONTINUED | OUTPATIENT
Start: 2017-07-28 | End: 2017-07-29 | Stop reason: HOSPADM

## 2017-07-28 RX ORDER — POTASSIUM CHLORIDE 20 MEQ/1
60 TABLET, EXTENDED RELEASE ORAL ONCE
Status: DISCONTINUED | OUTPATIENT
Start: 2017-07-28 | End: 2017-07-28

## 2017-07-28 RX ORDER — OXYCODONE AND ACETAMINOPHEN 7.5; 325 MG/1; MG/1
1 TABLET ORAL EVERY 4 HOURS PRN
Status: DISCONTINUED | OUTPATIENT
Start: 2017-07-28 | End: 2017-07-28

## 2017-07-28 RX ORDER — DEXTROSE MONOHYDRATE AND SODIUM CHLORIDE 5; .9 G/100ML; G/100ML
INJECTION, SOLUTION INTRAVENOUS CONTINUOUS
Status: DISCONTINUED | OUTPATIENT
Start: 2017-07-28 | End: 2017-07-29 | Stop reason: HOSPADM

## 2017-07-28 RX ORDER — GUAIFENESIN 100 MG/5ML
200 SOLUTION ORAL EVERY 4 HOURS PRN
Status: DISCONTINUED | OUTPATIENT
Start: 2017-07-28 | End: 2017-07-29 | Stop reason: HOSPADM

## 2017-07-28 RX ORDER — POTASSIUM CHLORIDE 20 MEQ/1
60 TABLET, EXTENDED RELEASE ORAL ONCE
Status: COMPLETED | OUTPATIENT
Start: 2017-07-28 | End: 2017-07-28

## 2017-07-28 RX ORDER — PANTOPRAZOLE SODIUM 40 MG/1
40 TABLET, DELAYED RELEASE ORAL DAILY
Status: DISCONTINUED | OUTPATIENT
Start: 2017-07-28 | End: 2017-07-29 | Stop reason: HOSPADM

## 2017-07-28 RX ORDER — DIPHENHYDRAMINE HYDROCHLORIDE 50 MG/ML
12.5 INJECTION INTRAMUSCULAR; INTRAVENOUS EVERY 6 HOURS
Status: COMPLETED | OUTPATIENT
Start: 2017-07-28 | End: 2017-07-29

## 2017-07-28 RX ORDER — ACETAMINOPHEN 325 MG/1
650 TABLET ORAL EVERY 8 HOURS PRN
Status: DISCONTINUED | OUTPATIENT
Start: 2017-07-28 | End: 2017-07-29 | Stop reason: HOSPADM

## 2017-07-28 RX ORDER — METRONIDAZOLE 500 MG/100ML
500 INJECTION, SOLUTION INTRAVENOUS
Status: DISCONTINUED | OUTPATIENT
Start: 2017-07-28 | End: 2017-07-28

## 2017-07-28 RX ADMIN — ACYCLOVIR SODIUM 590 MG: 50 INJECTION, SOLUTION INTRAVENOUS at 04:07

## 2017-07-28 RX ADMIN — DEXTROSE AND SODIUM CHLORIDE: 5; .9 INJECTION, SOLUTION INTRAVENOUS at 02:07

## 2017-07-28 RX ADMIN — MORPHINE SULFATE 2 MG: 2 INJECTION, SOLUTION INTRAMUSCULAR; INTRAVENOUS at 03:07

## 2017-07-28 RX ADMIN — ONDANSETRON 4 MG: 2 INJECTION INTRAMUSCULAR; INTRAVENOUS at 03:07

## 2017-07-28 RX ADMIN — CEFEPIME HYDROCHLORIDE 1 G: 1 INJECTION, SOLUTION INTRAVENOUS at 03:07

## 2017-07-28 RX ADMIN — NICOTINE 1 PATCH: 21 PATCH, EXTENDED RELEASE TRANSDERMAL at 09:07

## 2017-07-28 RX ADMIN — ACYCLOVIR SODIUM 590 MG: 50 INJECTION, SOLUTION INTRAVENOUS at 11:07

## 2017-07-28 RX ADMIN — DIPHENHYDRAMINE HYDROCHLORIDE 12.5 MG: 50 INJECTION, SOLUTION INTRAMUSCULAR; INTRAVENOUS at 12:07

## 2017-07-28 RX ADMIN — VANCOMYCIN HYDROCHLORIDE 1250 MG: 100 INJECTION, POWDER, LYOPHILIZED, FOR SOLUTION INTRAVENOUS at 10:07

## 2017-07-28 RX ADMIN — ONDANSETRON 4 MG: 4 TABLET, ORALLY DISINTEGRATING ORAL at 12:07

## 2017-07-28 RX ADMIN — ACYCLOVIR SODIUM 590 MG: 50 INJECTION, SOLUTION INTRAVENOUS at 09:07

## 2017-07-28 RX ADMIN — MORPHINE SULFATE 2 MG: 2 INJECTION, SOLUTION INTRAMUSCULAR; INTRAVENOUS at 09:07

## 2017-07-28 RX ADMIN — PANTOPRAZOLE SODIUM 40 MG: 40 TABLET, DELAYED RELEASE ORAL at 09:07

## 2017-07-28 RX ADMIN — PIPERACILLIN SODIUM AND TAZOBACTAM SODIUM 4.5 G: 4; .5 INJECTION, POWDER, LYOPHILIZED, FOR SOLUTION INTRAVENOUS at 10:07

## 2017-07-28 RX ADMIN — PROMETHAZINE HYDROCHLORIDE 6.25 MG: 25 INJECTION INTRAMUSCULAR; INTRAVENOUS at 11:07

## 2017-07-28 RX ADMIN — ALUMINUM HYDROXIDE, MAGNESIUM HYDROXIDE, AND SIMETHICONE 15 ML: 200; 200; 20 SUSPENSION ORAL at 01:07

## 2017-07-28 RX ADMIN — VANCOMYCIN HYDROCHLORIDE 1250 MG: 100 INJECTION, POWDER, LYOPHILIZED, FOR SOLUTION INTRAVENOUS at 06:07

## 2017-07-28 RX ADMIN — MORPHINE SULFATE 2 MG: 2 INJECTION, SOLUTION INTRAMUSCULAR; INTRAVENOUS at 06:07

## 2017-07-28 RX ADMIN — ONDANSETRON 4 MG: 2 INJECTION INTRAMUSCULAR; INTRAVENOUS at 06:07

## 2017-07-28 RX ADMIN — POTASSIUM CHLORIDE 60 MEQ: 20 SOLUTION ORAL at 01:07

## 2017-07-28 RX ADMIN — DIPHENHYDRAMINE HYDROCHLORIDE 12.5 MG: 50 INJECTION, SOLUTION INTRAMUSCULAR; INTRAVENOUS at 06:07

## 2017-07-28 RX ADMIN — MORPHINE SULFATE 2 MG: 2 INJECTION, SOLUTION INTRAMUSCULAR; INTRAVENOUS at 11:07

## 2017-07-28 RX ADMIN — POTASSIUM CHLORIDE 60 MEQ: 1500 TABLET, EXTENDED RELEASE ORAL at 03:07

## 2017-07-28 RX ADMIN — DIPHENHYDRAMINE HYDROCHLORIDE 12.5 MG: 50 INJECTION, SOLUTION INTRAMUSCULAR; INTRAVENOUS at 09:07

## 2017-07-28 NOTE — ED PROVIDER NOTES
SCRIBE #1 NOTE: I, Rachna Avery, am scribing for, and in the presence of, Crystal Reveles MD. I have scribed the entire note.      History      Chief Complaint   Patient presents with    Rash     generalized rash with multiple lesions for a few days       Review of patient's allergies indicates:  No Known Allergies     HPI   HPI    7/27/2017, 10:55 PM   History obtained from the patient      History of Present Illness: Samantha Dunaway is a 27 y.o. female patient who presents to the Emergency Department for rash which onset gradually a week ago. Pt reports hx of chickenpox and shingles. Pt denies being around others with similar sxs. Symptoms are constant and worsening in severity. The patient describes the sxs as located to the face, trunk, BUE, and BLE. No mitigating or exacerbating factors reported. Associated sxs include itching, redness to bilat wrists and hands, fever, HA, sore throat, rhinorrhea, and N/V. Pt denies recent travel. Patient denies any chills, congestion, drainage from lesions, difficulty swallowing, SOB, voice change, and all other sxs at this time. No further complaints or concerns at this time. Pt states she is on her menstrual cycle.    Arrival mode: Personal vehicle      PCP: Primary Doctor No       Past Medical History:  History reviewed. No pertinent past medical history.    Past Surgical History:  No past surgical history on file.      Family History:  No family history on file.    Social History:  Social History     Social History Main Topics    Smoking status: Current Every Day Smoker     Packs/day: 1.00     Types: Cigarettes    Smokeless tobacco: Unknown    Alcohol use Unknown    Drug use: No    Sexual activity: Yes     Partners: Male     Birth control/ protection: None       ROS   Review of Systems   Constitutional: Positive for fever. Negative for chills.   HENT: Positive for rhinorrhea and sore throat. Negative for congestion, trouble swallowing and voice change.     Respiratory: Negative for shortness of breath.    Cardiovascular: Negative for chest pain.   Gastrointestinal: Positive for nausea and vomiting.   Genitourinary: Negative for dysuria.   Musculoskeletal: Negative for back pain.   Skin: Positive for rash (to face, trunk, BUE, BLE).        (+) itching, redness to bilat wrists and hands   Neurological: Positive for headaches. Negative for weakness.   Hematological: Does not bruise/bleed easily.   All other systems reviewed and are negative.      Physical Exam      Initial Vitals [07/27/17 2241]   BP Pulse Resp Temp SpO2   (!) 144/78 (!) 120 18 (!) 101.4 °F (38.6 °C) 97 %      MAP       100          Physical Exam  Nursing Notes and Vital Signs Reviewed.  Constitutional: Patient is in no acute distress. Well-developed and well-nourished.  Head: Atraumatic. Normocephalic.  Eyes: PERRL. EOM intact. Conjunctivae are not pale. No scleral icterus.  ENT: Mucous membranes are moist. Oropharynx is clear and symmetric. No meningismus.  Neck: Supple. Full ROM. No lymphadenopathy.  Cardiovascular: Tachycardic. Regular rhythm. No murmurs, rubs, or gallops. Distal pulses are 2+ and symmetric.  Pulmonary/Chest: No respiratory distress. Clear to auscultation bilaterally. No wheezing, rales, or rhonchi.  Abdominal: Soft and non-distended.  There is no tenderness.  No rebound, guarding, or rigidity. Good bowel sounds.  Genitourinary: No CVA tenderness  Musculoskeletal: Moves all extremities. No obvious deformities. No edema. No calf tenderness.  Skin: Warm and dry. Diffuse macular vesicular rash in different stages to face, trunk, BUE, and BLE. Erythematous patch to bilateral hands, wrist, and palm.   Neurological:  Alert, awake, and appropriate.  Normal speech.  No acute focal neurological deficits are appreciated.  Psychiatric: Normal affect. Good eye contact. Appropriate in content.    ED Course    Procedures  ED Vital Signs:  Vitals:    07/27/17 2241 07/27/17 2321 07/28/17 0013  "07/28/17 0041   BP: (!) 144/78      Pulse: (!) 120   100   Resp: 18   18   Temp: (!) 101.4 °F (38.6 °C) (!) 101 °F (38.3 °C) 98.3 °F (36.8 °C)    TempSrc: Oral  Oral    SpO2: 97%   100%   Weight: 79.4 kg (175 lb)      Height: 5' 6" (1.676 m)       07/28/17 0241 07/28/17 0341   BP:     Pulse: 99 99   Resp:  18   Temp:  98.4 °F (36.9 °C)   TempSrc:  Oral   SpO2: 97% 99%   Weight:     Height:         Abnormal Lab Results:  Labs Reviewed   COMPREHENSIVE METABOLIC PANEL - Abnormal; Notable for the following:        Result Value    Potassium 2.8 (*)     Albumin 2.9 (*)     AST 73 (*)     ALT 82 (*)     All other components within normal limits    Narrative:     K critical result(s) called and verbal readback obtained from   Bouchra Santiago RN., 07/28/2017 00:57   URINALYSIS - Abnormal; Notable for the following:     Protein, UA Trace (*)     Bilirubin (UA) 1+ (*)     Occult Blood UA Trace (*)     Urobilinogen, UA 2.0-3.0 (*)     Leukocytes, UA 2+ (*)     All other components within normal limits   URINALYSIS MICROSCOPIC - Abnormal; Notable for the following:     WBC, UA 6 (*)     Bacteria, UA Few (*)     All other components within normal limits   CBC W/ AUTO DIFFERENTIAL - Abnormal; Notable for the following:     WBC 13.76 (*)     Hemoglobin 7.9 (*)     Hematocrit 27.6 (*)     MCV 65 (*)     MCH 18.6 (*)     MCHC 28.6 (*)     RDW 18.7 (*)     Gran # 10.7 (*)     Mono # 1.1 (*)     Gran% 77.9 (*)     Lymph% 13.3 (*)     All other components within normal limits   CULTURE, BLOOD   CULTURE, BLOOD   PREGNANCY TEST, URINE RAPID   LACTIC ACID, PLASMA   VARICELLA ZOSTER BY RAPID PCR   RAPID HIV        All Lab Results:  Results for orders placed or performed during the hospital encounter of 07/27/17   Comprehensive metabolic panel   Result Value Ref Range    Sodium 137 136 - 145 mmol/L    Potassium 2.8 (L) 3.5 - 5.1 mmol/L    Chloride 97 95 - 110 mmol/L    CO2 29 23 - 29 mmol/L    Glucose 95 70 - 110 mg/dL    BUN, Bld 8 6 - 20 " mg/dL    Creatinine 0.7 0.5 - 1.4 mg/dL    Calcium 9.2 8.7 - 10.5 mg/dL    Total Protein 7.0 6.0 - 8.4 g/dL    Albumin 2.9 (L) 3.5 - 5.2 g/dL    Total Bilirubin 1.0 0.1 - 1.0 mg/dL    Alkaline Phosphatase 75 55 - 135 U/L    AST 73 (H) 10 - 40 U/L    ALT 82 (H) 10 - 44 U/L    Anion Gap 11 8 - 16 mmol/L    eGFR if African American >60 >60 mL/min/1.73 m^2    eGFR if non African American >60 >60 mL/min/1.73 m^2   Urinalysis   Result Value Ref Range    Specimen UA Urine, Clean Catch     Color, UA Yellow Yellow, Straw, Leticia    Appearance, UA Clear Clear    pH, UA 6.0 5.0 - 8.0    Specific Gravity, UA 1.020 1.005 - 1.030    Protein, UA Trace (A) Negative    Glucose, UA Negative Negative    Ketones, UA Negative Negative    Bilirubin (UA) 1+ (A) Negative    Occult Blood UA Trace (A) Negative    Nitrite, UA Negative Negative    Urobilinogen, UA 2.0-3.0 (A) <2.0 EU/dL    Leukocytes, UA 2+ (A) Negative   Rapid Pregnancy, Urine   Result Value Ref Range    Preg Test, Ur Negative    Lactic acid, plasma   Result Value Ref Range    Lactate (Lactic Acid) 1.8 0.5 - 2.2 mmol/L   Varicella Zoster By Rapid Pcr Tissue   Result Value Ref Range    VZV by PCR Source skin swab left upper arm    Urinalysis Microscopic   Result Value Ref Range    RBC, UA 1 0 - 4 /hpf    WBC, UA 6 (H) 0 - 5 /hpf    Bacteria, UA Few (A) None-Occ /hpf    Squam Epithel, UA 3 /hpf    Microscopic Comment SEE COMMENT    CBC auto differential   Result Value Ref Range    WBC 13.76 (H) 3.90 - 12.70 K/uL    RBC 4.24 4.00 - 5.40 M/uL    Hemoglobin 7.9 (L) 12.0 - 16.0 g/dL    Hematocrit 27.6 (L) 37.0 - 48.5 %    MCV 65 (L) 82 - 98 fL    MCH 18.6 (L) 27.0 - 31.0 pg    MCHC 28.6 (L) 32.0 - 36.0 g/dL    RDW 18.7 (H) 11.5 - 14.5 %    Platelets 263 150 - 350 K/uL    MPV 10.3 9.2 - 12.9 fL    Gran # 10.7 (H) 1.8 - 7.7 K/uL    Lymph # 1.8 1.0 - 4.8 K/uL    Mono # 1.1 (H) 0.3 - 1.0 K/uL    Eos # 0.1 0.0 - 0.5 K/uL    Baso # 0.03 0.00 - 0.20 K/uL    Gran% 77.9 (H) 38.0 - 73.0 %     Lymph% 13.3 (L) 18.0 - 48.0 %    Mono% 7.8 4.0 - 15.0 %    Eosinophil% 0.8 0.0 - 8.0 %    Basophil% 0.2 0.0 - 1.9 %    Platelet Estimate Appears normal     Aniso Slight     Poik Slight     Poly Occasional     Hypo Occasional     Ovalocytes Occasional     Tear Drop Cells Occasional     Differential Method Automated    Rapid HIV   Result Value Ref Range    HIV Rapid Testing Negative Negative       Imaging Results:  Imaging Results          X-Ray Chest AP Portable (In process)                Ordered, reviewed, and independently interpreted by the ED provider.  Study: X-Ray Chest  Findings: No acute         The Emergency Provider reviewed the vital signs and test results, which are outlined above.    ED Discussion     1:32 AM: Discussed case with Dr. Riddle ([facility]), agrees with current care and management of pt and accepts admission. Dr. Riddle recommends starting Vanc, Benadryl, and Dextrose.  Admitting Service: [facility]   Admitting Physician: Dr. Riddle  Admit to: Med-surg    1:40 AM: Re-evaluated pt. I have discussed test results, shared treatment plan, and the need for admission with patient and family at bedside. Pt and family express understanding at this time and agree with all information. All questions answered. Pt and family have no further questions or concerns at this time. Pt is ready for admit.      ED Medication(s):  Medications   dextrose 5 % and 0.9 % NaCl infusion (not administered)   acetaminophen tablet 650 mg (not administered)   hydrALAZINE injection 10 mg (not administered)   guaifenesin 100 mg/5 ml syrup 200 mg (not administered)   pantoprazole EC tablet 40 mg (not administered)   nicotine 21 mg/24 hr 1 patch (not administered)   albuterol-ipratropium 2.5mg-0.5mg/3mL nebulizer solution 3 mL (not administered)   cefepime in dextrose 5 % 1 gram/50 mL IVPB 1 g (not administered)   ondansetron injection 4 mg (not administered)   promethazine (PHENERGAN) 6.25 mg in dextrose 5 % 50  mL IVPB (not administered)   metronidazole IVPB 500 mg (not administered)   oxycodone-acetaminophen 7.5-325 mg per tablet 1 tablet (not administered)   diphenhydrAMINE injection 12.5 mg (not administered)   morphine injection 2 mg (not administered)   acetaminophen tablet 650 mg (650 mg Oral Given 7/27/17 2321)   ondansetron disintegrating tablet 4 mg (4 mg Oral Given 7/28/17 0040)   potassium chloride 10% solution 60 mEq (60 mEq Oral Given 7/28/17 0118)   magic mouthwash (diphenhydrAMINE 12.5 mg/5 mL 20 mL, aluminum & magnesium hydroxide-simethicone (MYLANTA) 20 mL, lidocaine HCl 2% (XYLOCAINE) 20 mL) solution (15 mLs Swish & Spit Given 7/28/17 0140)   potassium chloride SA CR tablet 60 mEq (60 mEq Oral Given 7/28/17 0330)       New Prescriptions    No medications on file             Medical Decision Making    Medical Decision Making:   Clinical Tests:   Lab Tests: Ordered and Reviewed  Radiological Study: Ordered and Reviewed           Scribe Attestation:   Scribe #1: I performed the above scribed service and the documentation accurately describes the services I performed. I attest to the accuracy of the note.    Attending:   Physician Attestation Statement for Scribe #1: I, Crystal Reveles MD, personally performed the services described in this documentation, as scribed by Rachna Avery, in my presence, and it is both accurate and complete.          Clinical Impression       ICD-10-CM ICD-9-CM   1. Rash of entire body R21 782.1   2. Fever R50.9 780.60       Disposition:   Disposition: Admitted (Med-surg)  Condition: Fair         Crystal Reveles MD  07/28/17 4847

## 2017-07-28 NOTE — ASSESSMENT & PLAN NOTE
"- Rash all over the body involving both legs, both arms, trunk, back, buttocks, thighs, face, scalp, tongue, palate is more suggestive of "chicken pox", but patient reports that she had chicken pox infection many years ago.   - Second time chicken pox infection in lifetime is rare  - HIV negative  - VZV PCR pending  - Blood cultures pending.  - ID consult for further opinion/recommendations  "

## 2017-07-28 NOTE — ED NOTES
Pt continues to refuse BP. MD Reveles aware. RR e/u, airway open and patent. AAO x 3. Will continue to monitor.

## 2017-07-28 NOTE — H&P
"[facility] Hospital Medicine  History & Physical    Patient Name: Samantha Dunaway  MRN: [id number]  Admission Date: 7/27/2017  Attending Physician: Marvel Riddle MD  Primary Care Provider: Primary Doctor No         Patient information was obtained from patient and ER records.     Subjective:     Principal Problem:Sepsis    Chief Complaint:   Chief Complaint   Patient presents with    Rash     generalized rash with multiple lesions for a few days        HPI: Ms. Dunaway is a 26 y/o  female with no medical problems, marijuana user, preented to the ED c/o "rash all over my body, and its gettign worse". She reports first noticing small lesions all over her body on Sunday (5 days ago), but have been growing in number an severity of itching, pain since Wednesday. On Thursday yesterday she started having fever and hence came to the ED.     Temp 101.4, , WBC 25,000. Lactic acid 1.8.  Meets sepsis criteria, but not severe sepsis.     Rash all over involving both legs, arms, trunk, back, buttocks, thighs, face, scalp, tongue, palate is more suggestive of "chicken pox", but patient reports that she had chicken pox infection many years ago. HIV negative. VZV PCR pedning. Blood cultures pending. She c/o itching, pain. Denies any sick contacts at home.  and kids are fine, per patient. Does not recollect any sick contacts at home who have a rash.    Due to scratching/itching, the many vesicular-pustular lesions appear to be superinfected. Started on Vanc, Cefepime, Flagyl. Also benadryl IV scheduled to prevent her from scratching. Consulted ID for recommendations.      History reviewed. No pertinent past medical history.    History reviewed. No pertinent surgical history.    Review of patient's allergies indicates:  No Known Allergies    No current facility-administered medications on file prior to encounter.      Current Outpatient Prescriptions on File Prior to Encounter   Medication " Sig    ibuprofen (ADVIL,MOTRIN) 600 MG tablet Take 1 tablet (600 mg total) by mouth every 8 (eight) hours as needed for Pain.    ondansetron (ZOFRAN) 4 MG tablet Take 1 tablet (4 mg total) by mouth every 8 (eight) hours as needed for Nausea.     Family History     Reviewed and Not Pertinent        Social History Main Topics    Smoking status: Current Every Day Smoker     Packs/day: 1.00     Types: Cigarettes    Smokeless tobacco: Current User    Alcohol use No    Drug use:      Types: Marijuana    Sexual activity: Yes     Partners: Male     Birth control/ protection: None     Review of Systems   Constitutional: Positive for appetite change and fever. Negative for chills, diaphoresis and fatigue.   HENT: Negative for congestion, nosebleeds and sinus pressure.    Eyes: Negative.  Negative for photophobia, redness and visual disturbance.   Respiratory: Negative.  Negative for cough, chest tightness, shortness of breath and wheezing.    Cardiovascular: Negative.  Negative for chest pain, palpitations and leg swelling.   Gastrointestinal: Positive for nausea. Negative for abdominal pain, diarrhea and vomiting.   Endocrine: Negative.  Negative for polydipsia, polyphagia and polyuria.   Genitourinary: Negative.  Negative for dysuria, flank pain, frequency and urgency.   Musculoskeletal: Negative.  Negative for back pain, joint swelling and neck stiffness.   Skin: Positive for color change and rash (all over body red/pustular lesions that itch). Negative for pallor.   Allergic/Immunologic: Negative.  Negative for environmental allergies, food allergies and immunocompromised state.   Neurological: Positive for headaches. Negative for dizziness, syncope, speech difficulty and numbness.   Hematological: Negative.  Negative for adenopathy. Does not bruise/bleed easily.   Psychiatric/Behavioral: Negative.  Negative for confusion, decreased concentration and hallucinations. The patient is not nervous/anxious.    All other  systems reviewed and are negative.    Objective:     Vital Signs (Most Recent):  Temp: 98.4 °F (36.9 °C) (07/28/17 0341)  Pulse: 99 (07/28/17 0341)  Resp: 18 (07/28/17 0341)  BP: (!) 144/78 (07/27/17 2241)  SpO2: 99 % (07/28/17 0341) Vital Signs (24h Range):  Temp:  [98.3 °F (36.8 °C)-101.4 °F (38.6 °C)] 98.4 °F (36.9 °C)  Pulse:  [] 99  Resp:  [18] 18  SpO2:  [97 %-100 %] 99 %  BP: (144)/(78) 144/78     Weight: 79.4 kg (175 lb)  Body mass index is 28.25 kg/m².    Physical Exam   Constitutional: She is oriented to person, place, and time. No distress.   Uncomfortable   HENT:   Head: Normocephalic and atraumatic.   Eyes: Conjunctivae and EOM are normal. No scleral icterus.   Neck: Normal range of motion. Neck supple. No tracheal deviation present. No thyromegaly present.   Cardiovascular: Normal rate, regular rhythm, normal heart sounds and intact distal pulses.    No murmur heard.  Pulmonary/Chest: Effort normal and breath sounds normal. No respiratory distress. She has no wheezes.   Abdominal: Soft. She exhibits no distension. There is no tenderness.   Musculoskeletal: Normal range of motion. She exhibits no edema or tenderness.   Lymphadenopathy:     She has no cervical adenopathy.   Neurological: She is alert and oriented to person, place, and time. No cranial nerve deficit. She exhibits normal muscle tone. Coordination normal.   Skin: Rash (extensive vesicular-pustular lesions on face, tongue, palate, both upper and lower extremities, trunck, back, buttocks, thighs, including palms/soles) noted. She is not diaphoretic.   Psychiatric: Judgment and thought content normal. Her mood appears anxious.   Nursing note and vitals reviewed.       Significant Labs:   BMP:   Recent Labs  Lab 07/27/17  2330   GLU 95      K 2.8*   CL 97   CO2 29   BUN 8   CREATININE 0.7   CALCIUM 9.2     CBC:   Recent Labs  Lab 07/28/17  0013   WBC 13.76*   HGB 7.9*   HCT 27.6*        CMP:   Recent Labs  Lab 07/27/17  5260  "     K 2.8*   CL 97   CO2 29   GLU 95   BUN 8   CREATININE 0.7   CALCIUM 9.2   PROT 7.0   ALBUMIN 2.9*   BILITOT 1.0   ALKPHOS 75   AST 73*   ALT 82*   ANIONGAP 11   EGFRNONAA >60     Lactic Acid:   Recent Labs  Lab 07/27/17  2330   LACTATE 1.8     Lipid Panel: No results for input(s): CHOL, HDL, LDLCALC, TRIG, CHOLHDL in the last 48 hours.  Troponin: No results for input(s): TROPONINI in the last 48 hours.  Urine Culture: No results for input(s): LABURIN in the last 48 hours.  Urine Studies:   Recent Labs  Lab 07/27/17  2341   COLORU Yellow   APPEARANCEUA Clear   PHUR 6.0   SPECGRAV 1.020   PROTEINUA Trace*   GLUCUA Negative   KETONESU Negative   BILIRUBINUA 1+*   OCCULTUA Trace*   NITRITE Negative   UROBILINOGEN 2.0-3.0*   LEUKOCYTESUR 2+*   RBCUA 1   WBCUA 6*   BACTERIA Few*   SQUAMEPITHEL 3     All pertinent labs within the past 24 hours have been reviewed.    Significant Imaging: I have reviewed and interpreted all pertinent imaging results/findings within the past 24 hours.     Imaging Results          X-Ray Chest AP Portable (In process)                   Assessment/Plan:     * Sepsis    - Likely due to super-infection of vesicular-pustular lesions all over the body  - Temp 101.8, , WBC 25,000  - Blood cultures pending  - Vanc, Cefepime, Flagyl  - ID consult          Rash of entire body    - Rash all over the body involving both legs, both arms, trunk, back, buttocks, thighs, face, scalp, tongue, palate is more suggestive of "chicken pox", but patient reports that she had chicken pox infection many years ago.   - Second time chicken pox infection in lifetime is rare  - HIV negative  - VZV PCR pending  - Blood cultures pending.  - ID consult for further opinion/recommendations        Pruritus    - Benadryl IV scheduled to prevent scratching and super-infecting the lesions  - Calamine-zinc topical ointment if available          Tobacco use    - Nicotine patch  - Marijuana user            VTE Risk " Mitigation         Ordered     Place sequential compression device  Until discontinued      07/28/17 0545     Low Risk of VTE  Once      07/28/17 0544        Marvel Riddle MD  Department of Hospital Medicine   [facility]

## 2017-07-28 NOTE — ASSESSMENT & PLAN NOTE
- Benadryl IV scheduled to prevent scratching and super-infecting the lesions  - Calamine-zinc topical ointment if available

## 2017-07-28 NOTE — CONSULTS
Clinical Pharmacy Progress Note    Pharmacy consulted to dose vancomycin for treatment of rash/fever.     27 y.o. female  admitted for sepsis, rash with lesions , fever.     The patient has the following labs:     Date Creatinine (mg/dl)    BUN WBC Count   7/28/2017 Estimated Creatinine Clearance: 126 mL/min (based on Cr of 0.7). Lab Results   Component Value Date    BUN 8 07/27/2017     Lab Results   Component Value Date    WBC 13.76 (H) 07/28/2017        Ideal BW: 58.8 kg  Actual BW: 79.4  Adjusted (Dosing) BW: 67    Tmax/24h 101.4  Cultures:   Pend, poss chicken pox    Vancomycin (day 1 of therapy)   Other anti-infectives: cefepime, flagyl (day 1 of therapy)    Based on Estimated Creatinine Clearance: 126 mL/min (based on Cr of 0.7). and weight of 79.4kg, pharmacy will initiate vancomycin 1250 mg every 12 hours and draw a trough prior to 4th dose. Trough due 7/29 at 17:00. Pharmacy will continue to follow patients clinical status, renal function, C&S, and adjust dose as necessary to maintain trough levels between 15 and 20.     Thank you for allowing us to participate in this patient's care.     Areli Kwok   7/28/2017 8:52 AM

## 2017-07-28 NOTE — ED NOTES
Pt refusing for RN to take BP. NAD noted. RR e/u, airway open and patent. VSS. AAO x 3. Pt reports significant relief in pain and nausea. Will continue to monitor.

## 2017-07-28 NOTE — ASSESSMENT & PLAN NOTE
- Likely due to super-infection of vesicular-pustular lesions all over the body  - Temp 101.8, , WBC 25,000  - Blood cultures pending  - Vanc, Cefepime, Flagyl  - ID consult

## 2017-07-28 NOTE — ED NOTES
Report received from Michael, jones care of pt at this time. Patient lying in bed, no acute distress noted, awake, alert, and oriented x 3, calm, respirations even and unlabored, and skin is warm and dry. Pt has draining scabs/lesions all over body, itching reported. Patient verbalized understanding of status and plan of care. Patient denies needs at this time. Side rails up x 2, call light in reach, bed low and locked.  Pt brought cup of ice and water upon request, bedside commode provided in room. Will continue to monitor.

## 2017-07-28 NOTE — ED NOTES
The patient is resting quietly, eyes closed, arouses easily to stimuli. Airway is open and patent, respirations are spontaneous, normal respiratory effort and rate noted, skin warm and dry, appearance: in no acute distress and resting comfortably.

## 2017-07-28 NOTE — ED NOTES
MD Riddle notified that pt is reporting nausea and generalized pain. Ne new orders placed at this time.

## 2017-07-28 NOTE — ED NOTES
Pt unable to tolerate PO potassium. MD Reveles notified. Pt reports pain. MD notified. VSS. AAO x 3.

## 2017-07-28 NOTE — ED NOTES
Pt is currently resting in bed with eyes closed. NAD noted. AAO x 3. RR e/u, airway open and patent. Will continue to monitor.

## 2017-07-28 NOTE — ED NOTES
Pt placed on cardiac monitor. Pt refusing for RN to take BP. NAD noted. AAO x 3. POC discussed. RR e/u, airway open and patent. Pt placed on 2 L NC. Will continue to monitor.

## 2017-07-28 NOTE — ED NOTES
Pt resting comfortably. Call light in reach. Will continue to monitor. Pt educated on turning self and repositioning in the bed q2h

## 2017-07-28 NOTE — HPI
"Ms. Dunaway is a 26 y/o  female with no medical problems, marijuana user, preented to the ED c/o "rash all over my body, and its gettign worse". She reports first noticing small lesions all over her body on Sunday (5 days ago), but have been growing in number an severity of itching, pain since Wednesday. On Thursday yesterday she started having fever and hence came to the ED.     Temp 101.4, , WBC 25,000. Lactic acid 1.8.      Rash all over involving both legs, arms, trunk, back, buttocks, thighs, face, scalp, tongue, palate is more suggestive of "chicken pox", but patient reports that she had chicken pox infection many years ago. HIV negative. VZV PCR pedning. Blood cultures pending. She c/o itching, pain. Denies any sick contacts at home.  and kids are fine, per patient. Does not recollect any sick contacts at home who have a rash.    Due to scratching/itching, the many vesicular-pustular lesions appear to be superinfected. Started on Vanc, Cefepime, Flagyl. Also benadryl IV scheduled to prevent her from scratching. Consulted ID for recommendations.    "

## 2017-07-28 NOTE — SUBJECTIVE & OBJECTIVE
History reviewed. No pertinent past medical history.    History reviewed. No pertinent surgical history.    Review of patient's allergies indicates:  No Known Allergies    No current facility-administered medications on file prior to encounter.      Current Outpatient Prescriptions on File Prior to Encounter   Medication Sig    ibuprofen (ADVIL,MOTRIN) 600 MG tablet Take 1 tablet (600 mg total) by mouth every 8 (eight) hours as needed for Pain.    ondansetron (ZOFRAN) 4 MG tablet Take 1 tablet (4 mg total) by mouth every 8 (eight) hours as needed for Nausea.     Family History     None        Social History Main Topics    Smoking status: Current Every Day Smoker     Packs/day: 1.00     Types: Cigarettes    Smokeless tobacco: Current User    Alcohol use No    Drug use:      Types: Marijuana    Sexual activity: Yes     Partners: Male     Birth control/ protection: None     Review of Systems   Constitutional: Positive for appetite change and fever. Negative for chills, diaphoresis and fatigue.   HENT: Negative for congestion, nosebleeds and sinus pressure.    Eyes: Negative.  Negative for photophobia, redness and visual disturbance.   Respiratory: Negative.  Negative for cough, chest tightness, shortness of breath and wheezing.    Cardiovascular: Negative.  Negative for chest pain, palpitations and leg swelling.   Gastrointestinal: Positive for nausea. Negative for abdominal pain, diarrhea and vomiting.   Endocrine: Negative.  Negative for polydipsia, polyphagia and polyuria.   Genitourinary: Negative.  Negative for dysuria, flank pain, frequency and urgency.   Musculoskeletal: Negative.  Negative for back pain, joint swelling and neck stiffness.   Skin: Positive for color change and rash (all over body red/pustular lesions that itch). Negative for pallor.   Allergic/Immunologic: Negative.  Negative for environmental allergies, food allergies and immunocompromised state.   Neurological: Positive for headaches.  Negative for dizziness, syncope, speech difficulty and numbness.   Hematological: Negative.  Negative for adenopathy. Does not bruise/bleed easily.   Psychiatric/Behavioral: Negative.  Negative for confusion, decreased concentration and hallucinations. The patient is not nervous/anxious.    All other systems reviewed and are negative.    Objective:     Vital Signs (Most Recent):  Temp: 98.4 °F (36.9 °C) (07/28/17 0341)  Pulse: 99 (07/28/17 0341)  Resp: 18 (07/28/17 0341)  BP: (!) 144/78 (07/27/17 2241)  SpO2: 99 % (07/28/17 0341) Vital Signs (24h Range):  Temp:  [98.3 °F (36.8 °C)-101.4 °F (38.6 °C)] 98.4 °F (36.9 °C)  Pulse:  [] 99  Resp:  [18] 18  SpO2:  [97 %-100 %] 99 %  BP: (144)/(78) 144/78     Weight: 79.4 kg (175 lb)  Body mass index is 28.25 kg/m².    Physical Exam   Constitutional: She is oriented to person, place, and time. No distress.   Uncomfortable   HENT:   Head: Normocephalic and atraumatic.   Eyes: Conjunctivae and EOM are normal. No scleral icterus.   Neck: Normal range of motion. Neck supple. No tracheal deviation present. No thyromegaly present.   Cardiovascular: Normal rate, regular rhythm, normal heart sounds and intact distal pulses.    No murmur heard.  Pulmonary/Chest: Effort normal and breath sounds normal. No respiratory distress. She has no wheezes.   Abdominal: Soft. She exhibits no distension. There is no tenderness.   Musculoskeletal: Normal range of motion. She exhibits no edema or tenderness.   Lymphadenopathy:     She has no cervical adenopathy.   Neurological: She is alert and oriented to person, place, and time. No cranial nerve deficit. She exhibits normal muscle tone. Coordination normal.   Skin: Rash (extensive vesicular-pustular lesions on face, tongue, palate, both upper and lower extremities, trunck, back, buttocks, thighs, including palms/soles) noted. She is not diaphoretic.   Psychiatric: Judgment and thought content normal. Her mood appears anxious.   Nursing note  and vitals reviewed.       Significant Labs:   BMP:   Recent Labs  Lab 07/27/17  2330   GLU 95      K 2.8*   CL 97   CO2 29   BUN 8   CREATININE 0.7   CALCIUM 9.2     CBC:   Recent Labs  Lab 07/28/17  0013   WBC 13.76*   HGB 7.9*   HCT 27.6*        CMP:   Recent Labs  Lab 07/27/17  2330      K 2.8*   CL 97   CO2 29   GLU 95   BUN 8   CREATININE 0.7   CALCIUM 9.2   PROT 7.0   ALBUMIN 2.9*   BILITOT 1.0   ALKPHOS 75   AST 73*   ALT 82*   ANIONGAP 11   EGFRNONAA >60     Lactic Acid:   Recent Labs  Lab 07/27/17  2330   LACTATE 1.8     Lipid Panel: No results for input(s): CHOL, HDL, LDLCALC, TRIG, CHOLHDL in the last 48 hours.  Troponin: No results for input(s): TROPONINI in the last 48 hours.  Urine Culture: No results for input(s): LABURIN in the last 48 hours.  Urine Studies:   Recent Labs  Lab 07/27/17  2341   COLORU Yellow   APPEARANCEUA Clear   PHUR 6.0   SPECGRAV 1.020   PROTEINUA Trace*   GLUCUA Negative   KETONESU Negative   BILIRUBINUA 1+*   OCCULTUA Trace*   NITRITE Negative   UROBILINOGEN 2.0-3.0*   LEUKOCYTESUR 2+*   RBCUA 1   WBCUA 6*   BACTERIA Few*   SQUAMEPITHEL 3     All pertinent labs within the past 24 hours have been reviewed.    Significant Imaging: I have reviewed and interpreted all pertinent imaging results/findings within the past 24 hours.     Imaging Results          X-Ray Chest AP Portable (In process)

## 2017-07-28 NOTE — PLAN OF CARE
Problem: Patient Care Overview  Goal: Plan of Care Review  Outcome: Ongoing (interventions implemented as appropriate)  Pain and nausea controlled at this time. VSS. Awaiting PICC line placement to continue iv abx and ivf. POC reviewed with pt. Verbalizes understanding. Contact and airborne precautions in place as ordered. Chart reviewed. Will monitor.

## 2017-07-29 VITALS
HEART RATE: 86 BPM | WEIGHT: 175 LBS | SYSTOLIC BLOOD PRESSURE: 179 MMHG | RESPIRATION RATE: 20 BRPM | BODY MASS INDEX: 28.12 KG/M2 | DIASTOLIC BLOOD PRESSURE: 88 MMHG | HEIGHT: 66 IN | TEMPERATURE: 99 F | OXYGEN SATURATION: 100 %

## 2017-07-29 LAB
ALBUMIN SERPL BCP-MCNC: 2.4 G/DL
ALP SERPL-CCNC: 59 U/L
ALT SERPL W/O P-5'-P-CCNC: 51 U/L
ANION GAP SERPL CALC-SCNC: 7 MMOL/L
ANISOCYTOSIS BLD QL SMEAR: SLIGHT
ANISOCYTOSIS BLD QL SMEAR: SLIGHT
AST SERPL-CCNC: 33 U/L
BASOPHILS # BLD AUTO: 0.01 K/UL
BASOPHILS # BLD AUTO: 0.01 K/UL
BASOPHILS NFR BLD: 0.2 %
BASOPHILS NFR BLD: 0.2 %
BILIRUB SERPL-MCNC: 0.5 MG/DL
BUN SERPL-MCNC: 3 MG/DL
CALCIUM SERPL-MCNC: 8.3 MG/DL
CHLORIDE SERPL-SCNC: 105 MMOL/L
CO2 SERPL-SCNC: 29 MMOL/L
CREAT SERPL-MCNC: 0.7 MG/DL
DACRYOCYTES BLD QL SMEAR: ABNORMAL
DACRYOCYTES BLD QL SMEAR: NORMAL
DIFFERENTIAL METHOD: ABNORMAL
DIFFERENTIAL METHOD: NORMAL
EOSINOPHIL # BLD AUTO: 0.2 K/UL
EOSINOPHIL # BLD AUTO: 0.2 K/UL
EOSINOPHIL NFR BLD: 2.4 %
EOSINOPHIL NFR BLD: 2.7 %
ERYTHROCYTE [DISTWIDTH] IN BLOOD BY AUTOMATED COUNT: 18.6 %
ERYTHROCYTE [DISTWIDTH] IN BLOOD BY AUTOMATED COUNT: NORMAL %
EST. GFR  (AFRICAN AMERICAN): >60 ML/MIN/1.73 M^2
EST. GFR  (NON AFRICAN AMERICAN): >60 ML/MIN/1.73 M^2
GIANT PLATELETS BLD QL SMEAR: PRESENT
GIANT PLATELETS BLD QL SMEAR: PRESENT
GLUCOSE SERPL-MCNC: 103 MG/DL
HCT VFR BLD AUTO: 26.1 %
HCT VFR BLD AUTO: NORMAL %
HGB BLD-MCNC: 7.4 G/DL
HGB BLD-MCNC: NORMAL G/DL
HYPOCHROMIA BLD QL SMEAR: ABNORMAL
HYPOCHROMIA BLD QL SMEAR: NORMAL
LYMPHOCYTES # BLD AUTO: 1.6 K/UL
LYMPHOCYTES # BLD AUTO: 1.8 K/UL
LYMPHOCYTES NFR BLD: 27.3 %
LYMPHOCYTES NFR BLD: 28.3 %
MAGNESIUM SERPL-MCNC: 1.7 MG/DL
MCH RBC QN AUTO: 18.4 PG
MCH RBC QN AUTO: NORMAL PG
MCHC RBC AUTO-ENTMCNC: 28.4 G/DL
MCHC RBC AUTO-ENTMCNC: NORMAL G/DL
MCV RBC AUTO: 65 FL
MCV RBC AUTO: NORMAL FL
MONOCYTES # BLD AUTO: 0.4 K/UL
MONOCYTES # BLD AUTO: 0.5 K/UL
MONOCYTES NFR BLD: 6.8 %
MONOCYTES NFR BLD: 7.3 %
NEUTROPHILS # BLD AUTO: 3.7 K/UL
NEUTROPHILS # BLD AUTO: 3.8 K/UL
NEUTROPHILS NFR BLD: 62.1 %
NEUTROPHILS NFR BLD: 63 %
OVALOCYTES BLD QL SMEAR: ABNORMAL
OVALOCYTES BLD QL SMEAR: NORMAL
PHOSPHATE SERPL-MCNC: 3.6 MG/DL
PLATELET # BLD AUTO: 220 K/UL
PLATELET # BLD AUTO: NORMAL 10*3/UL
PLATELET BLD QL SMEAR: ABNORMAL
PLATELET BLD QL SMEAR: NORMAL
PMV BLD AUTO: ABNORMAL FL
PMV BLD AUTO: NORMAL FL
POIKILOCYTOSIS BLD QL SMEAR: SLIGHT
POIKILOCYTOSIS BLD QL SMEAR: SLIGHT
POLYCHROMASIA BLD QL SMEAR: ABNORMAL
POLYCHROMASIA BLD QL SMEAR: NORMAL
POTASSIUM SERPL-SCNC: 2.9 MMOL/L
PROT SERPL-MCNC: 5.8 G/DL
RBC # BLD AUTO: 4.03 M/UL
RBC # BLD AUTO: NORMAL 10*6/UL
SODIUM SERPL-SCNC: 141 MMOL/L
STOMATOCYTES BLD QL SMEAR: PRESENT
WBC # BLD AUTO: 6.18 K/UL

## 2017-07-29 PROCEDURE — S4991 NICOTINE PATCH NONLEGEND: HCPCS | Performed by: INTERNAL MEDICINE

## 2017-07-29 PROCEDURE — 84100 ASSAY OF PHOSPHORUS: CPT

## 2017-07-29 PROCEDURE — 25000003 PHARM REV CODE 250: Performed by: INTERNAL MEDICINE

## 2017-07-29 PROCEDURE — 25000003 PHARM REV CODE 250: Performed by: NURSE PRACTITIONER

## 2017-07-29 PROCEDURE — 25000003 PHARM REV CODE 250: Performed by: EMERGENCY MEDICINE

## 2017-07-29 PROCEDURE — 85025 COMPLETE CBC W/AUTO DIFF WBC: CPT

## 2017-07-29 PROCEDURE — 36415 COLL VENOUS BLD VENIPUNCTURE: CPT

## 2017-07-29 PROCEDURE — 83735 ASSAY OF MAGNESIUM: CPT

## 2017-07-29 PROCEDURE — 80053 COMPREHEN METABOLIC PANEL: CPT

## 2017-07-29 PROCEDURE — 63600175 PHARM REV CODE 636 W HCPCS: Performed by: INTERNAL MEDICINE

## 2017-07-29 RX ORDER — SULFAMETHOXAZOLE AND TRIMETHOPRIM 800; 160 MG/1; MG/1
2 TABLET ORAL 2 TIMES DAILY
Qty: 40 TABLET | Refills: 0 | Status: SHIPPED | OUTPATIENT
Start: 2017-07-29 | End: 2017-08-08

## 2017-07-29 RX ORDER — FERROUS SULFATE 325(65) MG
325 TABLET ORAL 2 TIMES DAILY
Qty: 60 TABLET | Refills: 0 | COMMUNITY
Start: 2017-07-29

## 2017-07-29 RX ORDER — ACYCLOVIR 800 MG/1
800 TABLET ORAL
Qty: 35 TABLET | Refills: 0 | Status: SHIPPED | OUTPATIENT
Start: 2017-07-29 | End: 2017-08-05

## 2017-07-29 RX ORDER — SULFAMETHOXAZOLE AND TRIMETHOPRIM 800; 160 MG/1; MG/1
1 TABLET ORAL 2 TIMES DAILY
Qty: 20 TABLET | Refills: 0 | Status: SHIPPED | OUTPATIENT
Start: 2017-07-29 | End: 2017-07-29

## 2017-07-29 RX ORDER — POTASSIUM CHLORIDE 20 MEQ/1
60 TABLET, EXTENDED RELEASE ORAL ONCE
Status: COMPLETED | OUTPATIENT
Start: 2017-07-29 | End: 2017-07-29

## 2017-07-29 RX ORDER — TRAMADOL HYDROCHLORIDE 50 MG/1
50 TABLET ORAL EVERY 6 HOURS PRN
Qty: 15 TABLET | Refills: 0 | Status: SHIPPED | OUTPATIENT
Start: 2017-07-29 | End: 2017-08-08

## 2017-07-29 RX ADMIN — MORPHINE SULFATE 2 MG: 2 INJECTION, SOLUTION INTRAMUSCULAR; INTRAVENOUS at 01:07

## 2017-07-29 RX ADMIN — ONDANSETRON 4 MG: 2 INJECTION INTRAMUSCULAR; INTRAVENOUS at 01:07

## 2017-07-29 RX ADMIN — DIPHENHYDRAMINE HYDROCHLORIDE 12.5 MG: 50 INJECTION, SOLUTION INTRAMUSCULAR; INTRAVENOUS at 03:07

## 2017-07-29 RX ADMIN — POTASSIUM CHLORIDE 60 MEQ: 1500 TABLET, EXTENDED RELEASE ORAL at 10:07

## 2017-07-29 RX ADMIN — PIPERACILLIN SODIUM AND TAZOBACTAM SODIUM 4.5 G: 4; .5 INJECTION, POWDER, LYOPHILIZED, FOR SOLUTION INTRAVENOUS at 12:07

## 2017-07-29 RX ADMIN — PIPERACILLIN SODIUM AND TAZOBACTAM SODIUM 4.5 G: 4; .5 INJECTION, POWDER, LYOPHILIZED, FOR SOLUTION INTRAVENOUS at 08:07

## 2017-07-29 RX ADMIN — ONDANSETRON 4 MG: 2 INJECTION INTRAMUSCULAR; INTRAVENOUS at 10:07

## 2017-07-29 RX ADMIN — NICOTINE 1 PATCH: 21 PATCH, EXTENDED RELEASE TRANSDERMAL at 10:07

## 2017-07-29 RX ADMIN — DEXTROSE AND SODIUM CHLORIDE: 5; .9 INJECTION, SOLUTION INTRAVENOUS at 05:07

## 2017-07-29 RX ADMIN — MORPHINE SULFATE 2 MG: 2 INJECTION, SOLUTION INTRAMUSCULAR; INTRAVENOUS at 05:07

## 2017-07-29 RX ADMIN — DIPHENHYDRAMINE HYDROCHLORIDE 12.5 MG: 50 INJECTION, SOLUTION INTRAMUSCULAR; INTRAVENOUS at 10:07

## 2017-07-29 RX ADMIN — PANTOPRAZOLE SODIUM 40 MG: 40 TABLET, DELAYED RELEASE ORAL at 09:07

## 2017-07-29 RX ADMIN — OXYCODONE HYDROCHLORIDE AND ACETAMINOPHEN 1 TABLET: 7.5; 325 TABLET ORAL at 09:07

## 2017-07-29 RX ADMIN — ACYCLOVIR SODIUM 590 MG: 50 INJECTION, SOLUTION INTRAVENOUS at 09:07

## 2017-07-29 RX ADMIN — OXYCODONE HYDROCHLORIDE AND ACETAMINOPHEN 1 TABLET: 7.5; 325 TABLET ORAL at 03:07

## 2017-07-29 NOTE — HPI
Emelyn is a 28 y/o  female with no medical problems, marijuana user,2 previous history of chicken pox who presented with history of generalized rash  that has worsened over the last 5 days.  There is associated history of fever  And pain. She denies any history of recent travel or contact with anyone with chickenpox.  In the ER, she was noted to be febrile with  Temp 101.4.    Lab data showed WBC 25,000. Lactic acid 1.8.

## 2017-07-29 NOTE — SUBJECTIVE & OBJECTIVE
History reviewed. No pertinent past medical history.    History reviewed. No pertinent surgical history.    Review of patient's allergies indicates:  No Known Allergies    Medications:  Prescriptions Prior to Admission   Medication Sig    ibuprofen (ADVIL,MOTRIN) 600 MG tablet Take 1 tablet (600 mg total) by mouth every 8 (eight) hours as needed for Pain.    ondansetron (ZOFRAN) 4 MG tablet Take 1 tablet (4 mg total) by mouth every 8 (eight) hours as needed for Nausea.     Antibiotics     Start     Stop Route Frequency Ordered    07/28/17 0800  piperacillin-tazobactam 4.5 g in dextrose 5 % 100 mL IVPB (ready to mix system)      -- IV Every 8 hours (non-standard times) 07/28/17 0606    07/28/17 0630  vancomycin (VANCOCIN) 1,250 mg in dextrose 5 % 250 mL IVPB  (Vancomycin IVPB with levels panel)      -- IV Every 12 hours (non-standard times) 07/28/17 0618        Antifungals     None        Antivirals         Stop Route Frequency     acyclovir (ZOVIRAX) injection      -- IV Every 8 hours (non-standard times)             There is no immunization history on file for this patient.    Family History     None        Social History     Social History    Marital status: Single     Spouse name: N/A    Number of children: N/A    Years of education: N/A     Social History Main Topics    Smoking status: Current Every Day Smoker     Packs/day: 1.00     Types: Cigarettes    Smokeless tobacco: Current User    Alcohol use No    Drug use:      Types: Marijuana    Sexual activity: Yes     Partners: Male     Birth control/ protection: None     Other Topics Concern    None     Social History Narrative    None     Review of Systems   Constitutional: Positive for appetite change and fever. Negative for chills, diaphoresis and fatigue.   HENT: Negative for congestion, nosebleeds and sinus pressure.    Eyes: Negative.  Negative for photophobia, redness and visual disturbance.   Respiratory: Negative.  Negative for cough, chest  tightness, shortness of breath and wheezing.    Cardiovascular: Negative.  Negative for chest pain, palpitations and leg swelling.   Gastrointestinal: Positive for nausea. Negative for abdominal pain, diarrhea and vomiting.   Endocrine: Negative.  Negative for polydipsia, polyphagia and polyuria.   Genitourinary: Negative.  Negative for dysuria, flank pain, frequency and urgency.   Musculoskeletal: Negative.  Negative for back pain, joint swelling and neck stiffness.   Skin: Positive for color change and rash (all over body red/pustular lesions that itch). Negative for pallor.   Allergic/Immunologic: Negative.  Negative for environmental allergies, food allergies and immunocompromised state.   Neurological: Positive for headaches. Negative for dizziness, syncope, speech difficulty and numbness.   Hematological: Negative.  Negative for adenopathy. Does not bruise/bleed easily.   Psychiatric/Behavioral: Negative.  Negative for confusion, decreased concentration and hallucinations. The patient is not nervous/anxious.    All other systems reviewed and are negative.    Objective:     Vital Signs (Most Recent):  Temp: 98.2 °F (36.8 °C) (07/28/17 2124)  Pulse: 93 (07/28/17 2124)  Resp: 18 (07/28/17 2124)  BP: (!) 159/79 (07/28/17 2124)  SpO2: 99 % (07/28/17 2124) Vital Signs (24h Range):  Temp:  [97.3 °F (36.3 °C)-101.4 °F (38.6 °C)] 98.2 °F (36.8 °C)  Pulse:  [] 93  Resp:  [16-20] 18  SpO2:  [97 %-100 %] 99 %  BP: (121-159)/(59-79) 159/79     Weight: 79.4 kg (175 lb)  Body mass index is 28.25 kg/m².    Estimated Creatinine Clearance: 126 mL/min (based on Cr of 0.7).    Physical Exam   Constitutional: She is oriented to person, place, and time. No distress.   Uncomfortable   HENT:   Head: Normocephalic and atraumatic.   Eyes: Conjunctivae and EOM are normal. No scleral icterus.   Neck: Normal range of motion. Neck supple. No tracheal deviation present. No thyromegaly present.   Cardiovascular: Normal rate, regular  rhythm, normal heart sounds and intact distal pulses.    No murmur heard.  Pulmonary/Chest: Effort normal and breath sounds normal. No respiratory distress. She has no wheezes.   Abdominal: Soft. She exhibits no distension. There is no tenderness.   Musculoskeletal: Normal range of motion. She exhibits no edema or tenderness.   Lymphadenopathy:     She has no cervical adenopathy.   Neurological: She is alert and oriented to person, place, and time. No cranial nerve deficit. She exhibits normal muscle tone. Coordination normal.   Skin: Rash (extensive vesicular-pustular lesions on face, tongue, palate, both upper and lower extremities, trunck, back, buttocks, thighs, including palms/soles) noted. She is not diaphoretic.   Psychiatric: Judgment and thought content normal. Her mood appears anxious.   Nursing note and vitals reviewed.      Significant Labs:   BMP:   Recent Labs  Lab 07/27/17  2330   GLU 95      K 2.8*   CL 97   CO2 29   BUN 8   CREATININE 0.7   CALCIUM 9.2     CBC:   Recent Labs  Lab 07/28/17  0013   WBC 13.76*   HGB 7.9*   HCT 27.6*        Wound Culture: No results for input(s): LABAERO in the last 4320 hours.  All pertinent labs within the past 24 hours have been reviewed.    Significant Imaging: I have reviewed all pertinent imaging results/findings within the past 24 hours.

## 2017-07-29 NOTE — PROGRESS NOTES
[id number] - [place], [place] - [address] AT [facility] & [address]  [address] [address]  Phone: [phone] Fax: [phone]    Rx faxed to pharmacy to obtain co-payment.  Rxs not signed by NP.    ILSA provided patient with a list of PCPs obtained from her insurance provider's website.     Celina Foreman LMSW, MICHELLE-ILSA, CCM  07/29/2017

## 2017-07-29 NOTE — CONSULTS
[facility]  Infectious Disease  Consult Note    Patient Name: Samantha Dunaway  MRN: [id number]  Admission Date: 7/27/2017  Hospital Length of Stay: 0 days  Attending Physician: Yaron Rojas, *  Primary Care Provider: Primary Doctor No     Isolation Status: Contact and Airborne    Patient information was obtained from patient and ER records.      Consults  Assessment/Plan:     Tobacco use    Counseled on cessation         Rash of entire body    Chicken pox is suspected -will add acyclovir and use airborne/contact isolation.  Patients with varicella should be cared for by staff with evidence of immunity.  Will add vanco/zosyn for superimposed bacterial infection.            Thank you for your consult. I will follow-up with patient. Please contact us if you have any additional questions.    Carroll Jiménez MD  Infectious Disease  [facility]    Subjective:     Principal Problem: Sepsis    HPI: Emelyn is a 26 y/o  female with no medical problems, marijuana user,2 previous history of chicken pox who presented with history of generalized rash  that has worsened over the last 5 days.  There is associated history of fever  And pain. She denies any history of recent travel or contact with anyone with chickenpox.  In the ER, she was noted to be febrile with  Temp 101.4.    Lab data showed WBC 25,000. Lactic acid 1.8.       History reviewed. No pertinent past medical history.    History reviewed. No pertinent surgical history.    Review of patient's allergies indicates:  No Known Allergies    Medications:  Prescriptions Prior to Admission   Medication Sig    ibuprofen (ADVIL,MOTRIN) 600 MG tablet Take 1 tablet (600 mg total) by mouth every 8 (eight) hours as needed for Pain.    ondansetron (ZOFRAN) 4 MG tablet Take 1 tablet (4 mg total) by mouth every 8 (eight) hours as needed for Nausea.     Antibiotics     Start     Stop Route Frequency Ordered    07/28/17 0800   piperacillin-tazobactam 4.5 g in dextrose 5 % 100 mL IVPB (ready to mix system)      -- IV Every 8 hours (non-standard times) 07/28/17 0606    07/28/17 0630  vancomycin (VANCOCIN) 1,250 mg in dextrose 5 % 250 mL IVPB  (Vancomycin IVPB with levels panel)      -- IV Every 12 hours (non-standard times) 07/28/17 0618        Antifungals     None        Antivirals         Stop Route Frequency     acyclovir (ZOVIRAX) injection      -- IV Every 8 hours (non-standard times)             There is no immunization history on file for this patient.    Family History     None        Social History     Social History    Marital status: Single     Spouse name: N/A    Number of children: N/A    Years of education: N/A     Social History Main Topics    Smoking status: Current Every Day Smoker     Packs/day: 1.00     Types: Cigarettes    Smokeless tobacco: Current User    Alcohol use No    Drug use:      Types: Marijuana    Sexual activity: Yes     Partners: Male     Birth control/ protection: None     Other Topics Concern    None     Social History Narrative    None     Review of Systems   Constitutional: Positive for appetite change and fever. Negative for chills, diaphoresis and fatigue.   HENT: Negative for congestion, nosebleeds and sinus pressure.    Eyes: Negative.  Negative for photophobia, redness and visual disturbance.   Respiratory: Negative.  Negative for cough, chest tightness, shortness of breath and wheezing.    Cardiovascular: Negative.  Negative for chest pain, palpitations and leg swelling.   Gastrointestinal: Positive for nausea. Negative for abdominal pain, diarrhea and vomiting.   Endocrine: Negative.  Negative for polydipsia, polyphagia and polyuria.   Genitourinary: Negative.  Negative for dysuria, flank pain, frequency and urgency.   Musculoskeletal: Negative.  Negative for back pain, joint swelling and neck stiffness.   Skin: Positive for color change and rash (all over body red/pustular lesions that  itch). Negative for pallor.   Allergic/Immunologic: Negative.  Negative for environmental allergies, food allergies and immunocompromised state.   Neurological: Positive for headaches. Negative for dizziness, syncope, speech difficulty and numbness.   Hematological: Negative.  Negative for adenopathy. Does not bruise/bleed easily.   Psychiatric/Behavioral: Negative.  Negative for confusion, decreased concentration and hallucinations. The patient is not nervous/anxious.    All other systems reviewed and are negative.    Objective:     Vital Signs (Most Recent):  Temp: 98.2 °F (36.8 °C) (07/28/17 2124)  Pulse: 93 (07/28/17 2124)  Resp: 18 (07/28/17 2124)  BP: (!) 159/79 (07/28/17 2124)  SpO2: 99 % (07/28/17 2124) Vital Signs (24h Range):  Temp:  [97.3 °F (36.3 °C)-101.4 °F (38.6 °C)] 98.2 °F (36.8 °C)  Pulse:  [] 93  Resp:  [16-20] 18  SpO2:  [97 %-100 %] 99 %  BP: (121-159)/(59-79) 159/79     Weight: 79.4 kg (175 lb)  Body mass index is 28.25 kg/m².    Estimated Creatinine Clearance: 126 mL/min (based on Cr of 0.7).    Physical Exam   Constitutional: She is oriented to person, place, and time. No distress.   Uncomfortable   HENT:   Head: Normocephalic and atraumatic.   Eyes: Conjunctivae and EOM are normal. No scleral icterus.   Neck: Normal range of motion. Neck supple. No tracheal deviation present. No thyromegaly present.   Cardiovascular: Normal rate, regular rhythm, normal heart sounds and intact distal pulses.    No murmur heard.  Pulmonary/Chest: Effort normal and breath sounds normal. No respiratory distress. She has no wheezes.   Abdominal: Soft. She exhibits no distension. There is no tenderness.   Musculoskeletal: Normal range of motion. She exhibits no edema or tenderness.   Lymphadenopathy:     She has no cervical adenopathy.   Neurological: She is alert and oriented to person, place, and time. No cranial nerve deficit. She exhibits normal muscle tone. Coordination normal.   Skin: Rash (extensive  vesicular-pustular lesions on face, tongue, palate, both upper and lower extremities, trunck, back, buttocks, thighs, including palms/soles) noted.                     She is not diaphoretic.   Psychiatric: Judgment and thought content normal. Her mood appears anxious.   Nursing note and vitals reviewed.      Significant Labs:   BMP:   Recent Labs  Lab 07/27/17  2330   GLU 95      K 2.8*   CL 97   CO2 29   BUN 8   CREATININE 0.7   CALCIUM 9.2     CBC:   Recent Labs  Lab 07/28/17  0013   WBC 13.76*   HGB 7.9*   HCT 27.6*        Wound Culture: No results for input(s): LABAERO in the last 4320 hours.  All pertinent labs within the past 24 hours have been reviewed.    Significant Imaging: I have reviewed all pertinent imaging results/findings within the past 24 hours.

## 2017-07-29 NOTE — PROGRESS NOTES
ILSA f/u with patient to obtain the name of her PCP.  Patient stated that she did not recall the name of her PCP could only remember that the doctor was in [place].    Celina Foreman, CARINA, MICHELLE-ILSA, [facility]  07/29/2017

## 2017-07-29 NOTE — PROGRESS NOTES
cost of Rxs faxed to pharmacy = $1.50.  ILSA advised pharmacist that patient would be in today to pickup Rx in about 2 hours.  SW printed out additional OB/GYN physicians from patient's insurance provider's website so that she might call on Monday to make f/u appt.    Rx for Tramadol was not faxed to pharmacy.      Contact information for medicaid transportation provided to patient.    Celina Foreman LMSW, MICHELLE-Ilsa, CCM  07/29/2017

## 2017-07-29 NOTE — ASSESSMENT & PLAN NOTE
Chicken pox is suspected -will add acyclovir and use airborne/contact isolation.  Patients with varicella should be cared for by staff with evidence of immunity.  Will add vanco/zosyn for superimposed bacterial infection.

## 2017-07-29 NOTE — PLAN OF CARE
Problem: Patient Care Overview  Goal: Plan of Care Review  Outcome: Outcome(s) achieved Date Met: 07/29/17  Pt being discharged today. Discharged instructions given pt verbalized understanding and teachback. PICC line removed cath tip intact clean, dry, dressing intact. 12 hr chart check complete.

## 2017-07-29 NOTE — PLAN OF CARE
Problem: Patient Care Overview  Goal: Plan of Care Review  Outcome: Ongoing (interventions implemented as appropriate)  Room air, respirations even and unlabored, no distress noted on assessment, pain, no nausea or shortness of breath, generalized rash with drainage, contact and air born isolation, right upper arm picc dl

## 2017-07-29 NOTE — PROCEDURES
"Samantha Dunaway is a 27 y.o. female patient.    Temp: 98.6 °F (37 °C) (07/28/17 1653)  Pulse: 100 (07/28/17 1653)  Resp: 20 (07/28/17 1653)  BP: (!) 159/72 (07/28/17 1653)  SpO2: 100 % (07/28/17 1653)  Weight: 79.4 kg (175 lb) (07/27/17 2241)  Height: 5' 6" (167.6 cm) (07/27/17 2241)    PICC  Date/Time: 7/28/2017 8:04 PM  Performed by: CHRISTOPHER ROACH  Consent Done: Yes  Time out: Immediately prior to procedure a time out was called to verify the correct patient, procedure, equipment, support staff and site/side marked as required  Indications: med administration and vascular access  Anesthesia: local infiltration  Local anesthetic: lidocaine 1% without epinephrine  Anesthetic Total (mL): 5  Description of findings: picc  Preparation: skin prepped with chlorhexidine (without alcohol)  Skin prep agent dried: skin prep agent completely dried prior to procedure  Sterile barriers: all five maximum sterile barriers used - cap, mask, sterile gown, sterile gloves, and large sterile sheet  Hand hygiene: hand hygiene performed prior to central venous catheter insertion  Location details: right basilic  Catheter type: double lumen  Catheter size: 5 Fr  Catheter Length: 40cm    Ultrasound guidance: yes  Vessel Caliber: medium and patent, compressibility normal  Vascular Doppler: not done  Needle advanced into vessel with real time Ultrasound guidance.  Guidewire confirmed in vessel.  Sterile sheath used.  no esophageal manometryNumber of attempts: 1  Post-procedure: blood return through all ports, chlorhexidine patch and sterile dressing applied  Estimated blood loss (mL): 0  Specimens: No  Implants: No  Assessment: placement verified by x-ray, tip termination and successful placement  Complications: none        Christopher Roach  7/28/2017  "

## 2017-07-30 LAB
SPECIMEN SOURCE: NORMAL
VARICELLA ZOSTER BY PCR RESULT: NEGATIVE

## 2017-07-30 NOTE — HOSPITAL COURSE
The pt was admitted for sepsis due to rash on IV Acyclovir, Vancomycin, Cefepime and IVFs. Sepsis resolved. WBC normalized. Pt became afebrile.  HIV negative, Blood cultures showed NGTD. VZV PCR still pending. Care discussed with ID. Rash and forearm cellulitis improved. Pt will be discharged on Acyclovir and Bactrim DS to follow up with PCP. The pt was seen and examined today and determined to be stable for discharge.

## 2017-07-30 NOTE — DISCHARGE SUMMARY
"[facility] Hospital Medicine  Discharge Summary      Patient Name: Samantha Dunaway  MRN: [id number]  Admission Date: 7/27/2017  Hospital Length of Stay: 1 days  Discharge Date: 7/29/17   Attending Physician: Dr Horowitz  Discharging Provider: Lara Herzog NP  Primary Care Provider: Primary Doctor No      HPI:   Ms. Dunaway is a 28 y/o  female with no medical problems, marijuana user, preented to the ED c/o "rash all over my body, and its gettign worse". She reports first noticing small lesions all over her body on Sunday (5 days ago), but have been growing in number an severity of itching, pain since Wednesday. On Thursday yesterday she started having fever and hence came to the ED.     Temp 101.4, , WBC 25,000. Lactic acid 1.8.      Rash all over involving both legs, arms, trunk, back, buttocks, thighs, face, scalp, tongue, palate is more suggestive of "chicken pox", but patient reports that she had chicken pox infection many years ago. HIV negative. VZV PCR pedning. Blood cultures pending. She c/o itching, pain. Denies any sick contacts at home.  and kids are fine, per patient. Does not recollect any sick contacts at home who have a rash.    Due to scratching/itching, the many vesicular-pustular lesions appear to be superinfected. Started on Vanc, Cefepime, Flagyl. Also benadryl IV scheduled to prevent her from scratching. Consulted ID for recommendations.      * No surgery found *      Indwelling Lines/Drains at time of discharge:   Lines/Drains/Airways     Peripherally Inserted Central Catheter Line                 PICC Double Lumen 07/28/17 1940 right basilic 1 day              Hospital Course:   The pt was admitted for sepsis due to rash on IV Acyclovir, Vancomycin, Cefepime and IVFs. Sepsis resolved. WBC normalized. Pt became afebrile.  HIV negative, Blood cultures showed NGTD. VZV PCR still pending. Care discussed with ID. Rash and forearm cellulitis improved. Pt " will be discharged on Acyclovir and Bactrim DS to follow up with PCP. The pt was seen and examined today and determined to be stable for discharge.        Consults:   Consults         Status Ordering Provider     Inpatient consult to Social Work  Once     Provider:  (Not yet assigned)    Completed JAMES HUDSON          Significant Diagnostic Studies: {  Imaging Results          X-Ray Chest 1 View for PICC_Central line (Final result)  Result time 07/28/17 20:47:45    Final result by Tian Kapadia MD (07/28/17 20:47:45)                 Impression:     PICC line in place as described above.      Electronically signed by: TIAN KAPADIA MD  Date:     07/28/17  Time:    20:47              Narrative:    Exam: Portable chest xray    History:   Fitting and adjustment of vascular catheter, PICC line insertion evaluation.      Findings: Right sided PICC line noted in place with the tip in superior vena cava. No pneumothorax.                             X-Ray Chest AP Portable (Final result)  Result time 07/28/17 07:49:08    Final result by Mago Zamudio MD (07/28/17 07:49:08)                 Impression:     Negative      Electronically signed by: MAGO ZAMUDIO MD  Date:     07/28/17  Time:    07:49              Narrative:    History: Fever    Normal heart size. Clear lungs.                              Pending Diagnostic Studies:     Procedure Component Value Units Date/Time    Comprehensive metabolic panel [[id number]] Collected:  07/29/17 0515    Order Status:  Sent Lab Status:  In process Updated:  07/29/17 0516    Specimen:  Blood from Blood     Magnesium [[id number]] Collected:  07/29/17 0515    Order Status:  Sent Lab Status:  In process Updated:  07/29/17 0516    Specimen:  Blood from Blood     Phosphorus [[id number]] Collected:  07/29/17 0515    Order Status:  Sent Lab Status:  In process Updated:  07/29/17 0516    Specimen:  Blood from Blood         Final Active Diagnoses:    Diagnosis Date Noted POA    PRINCIPAL  PROBLEM:  Sepsis [A41.9] 07/28/2017 Yes    Rash of entire body [R21] 07/28/2017 Yes    Pruritus [L29.9] 07/28/2017 Yes    Tobacco use [Z72.0] 07/28/2017 Yes      Problems Resolved During this Admission:    Diagnosis Date Noted Date Resolved POA          Discharged Condition: stable    Disposition: Home or Self Care    Follow Up:  Follow-up Information     Primary Doctor No In 3 days.    Why:  repeat CBC in one week            Please follow up.    Why:  OB/GYN in 1-2 weeks               Patient Instructions:     Diet general     Activity as tolerated       Medications:  Reconciled Home Medications:   Discharge Medication List as of 7/29/2017  5:14 PM      START taking these medications    Details   acyclovir (ZOVIRAX) 800 MG Tab Take 1 tablet (800 mg total) by mouth 5 (five) times daily., Starting Sat 7/29/2017, Until Sat 8/5/2017, Print      ferrous sulfate 325 mg (65 mg iron) Tab tablet Take 1 tablet (325 mg total) by mouth 2 (two) times daily., Starting Sat 7/29/2017, OTC      tramadol (ULTRAM) 50 mg tablet Take 1 tablet (50 mg total) by mouth every 6 (six) hours as needed for Pain., Starting Sat 7/29/2017, Until Tue 8/8/2017, Print         CONTINUE these medications which have CHANGED    Details   sulfamethoxazole-trimethoprim 800-160mg (BACTRIM DS) 800-160 mg Tab Take 2 tablets by mouth 2 (two) times daily., Starting Sat 7/29/2017, Until Tue 8/8/2017, Print         CONTINUE these medications which have NOT CHANGED    Details   ibuprofen (ADVIL,MOTRIN) 600 MG tablet Take 1 tablet (600 mg total) by mouth every 8 (eight) hours as needed for Pain., Starting 4/22/2017, Until Discontinued, Print      ondansetron (ZOFRAN) 4 MG tablet Take 1 tablet (4 mg total) by mouth every 8 (eight) hours as needed for Nausea., Starting 4/22/2017, Until Discontinued, Print           Time spent on the discharge of patient: 45 minutes    Lara Herzog NP  Department of Hospital Medicine  [facility]

## 2017-08-02 LAB
BACTERIA BLD CULT: NORMAL
BACTERIA BLD CULT: NORMAL